# Patient Record
Sex: FEMALE | Race: OTHER | HISPANIC OR LATINO
[De-identification: names, ages, dates, MRNs, and addresses within clinical notes are randomized per-mention and may not be internally consistent; named-entity substitution may affect disease eponyms.]

---

## 2017-02-15 PROBLEM — Z00.00 ENCOUNTER FOR PREVENTIVE HEALTH EXAMINATION: Status: ACTIVE | Noted: 2017-02-15

## 2017-02-18 ENCOUNTER — APPOINTMENT (OUTPATIENT)
Dept: MRI IMAGING | Facility: HOSPITAL | Age: 72
End: 2017-02-18

## 2018-03-27 ENCOUNTER — INPATIENT (INPATIENT)
Facility: HOSPITAL | Age: 73
LOS: 9 days | Discharge: HOME | DRG: 872 | End: 2018-04-06
Attending: INTERNAL MEDICINE | Admitting: INTERNAL MEDICINE
Payer: MEDICARE

## 2018-03-27 VITALS
DIASTOLIC BLOOD PRESSURE: 72 MMHG | WEIGHT: 132.06 LBS | SYSTOLIC BLOOD PRESSURE: 111 MMHG | HEART RATE: 126 BPM | OXYGEN SATURATION: 97 % | RESPIRATION RATE: 18 BRPM | TEMPERATURE: 100 F

## 2018-03-27 LAB
ALBUMIN SERPL ELPH-MCNC: 3.7 G/DL — SIGNIFICANT CHANGE UP (ref 3.5–5)
ANION GAP SERPL CALC-SCNC: 10 MMOL/L — SIGNIFICANT CHANGE UP (ref 5–17)
APPEARANCE UR: CLEAR — SIGNIFICANT CHANGE UP
BASOPHILS # BLD AUTO: 0 K/UL — SIGNIFICANT CHANGE UP (ref 0–0.2)
BASOPHILS NFR BLD AUTO: 0.4 % — SIGNIFICANT CHANGE UP (ref 0–2)
BILIRUB UR-MCNC: NEGATIVE — SIGNIFICANT CHANGE UP
CALCIUM SERPL-MCNC: 9.4 MG/DL — SIGNIFICANT CHANGE UP (ref 8.4–10.5)
CHLORIDE SERPL-SCNC: 103 MMOL/L — SIGNIFICANT CHANGE UP (ref 96–108)
CO2 SERPL-SCNC: 25 MMOL/L — SIGNIFICANT CHANGE UP (ref 22–31)
COLOR SPEC: YELLOW — SIGNIFICANT CHANGE UP
DIFF PNL FLD: ABNORMAL
EOSINOPHIL # BLD AUTO: 0 K/UL — SIGNIFICANT CHANGE UP (ref 0–0.5)
EOSINOPHIL NFR BLD AUTO: 0.1 % — SIGNIFICANT CHANGE UP (ref 0–6)
GLUCOSE SERPL-MCNC: 198 MG/DL — HIGH (ref 70–99)
GLUCOSE UR QL: 1000 MG/DL
HCT VFR BLD CALC: 35.6 % — SIGNIFICANT CHANGE UP (ref 34.5–45)
HGB BLD-MCNC: 12 G/DL — SIGNIFICANT CHANGE UP (ref 11.5–15.5)
KETONES UR-MCNC: NEGATIVE — SIGNIFICANT CHANGE UP
LACTATE SERPL-SCNC: 2.9 MMOL/L — HIGH (ref 0.7–2)
LEUKOCYTE ESTERASE UR-ACNC: NEGATIVE — SIGNIFICANT CHANGE UP
LYMPHOCYTES # BLD AUTO: 0.8 K/UL — LOW (ref 1–3.3)
LYMPHOCYTES # BLD AUTO: 7.1 % — LOW (ref 13–44)
MCHC RBC-ENTMCNC: 29.7 PG — SIGNIFICANT CHANGE UP (ref 27–34)
MCHC RBC-ENTMCNC: 33.8 GM/DL — SIGNIFICANT CHANGE UP (ref 32–36)
MCV RBC AUTO: 88 FL — SIGNIFICANT CHANGE UP (ref 80–100)
MONOCYTES # BLD AUTO: 0.4 K/UL — SIGNIFICANT CHANGE UP (ref 0–0.9)
MONOCYTES NFR BLD AUTO: 3.8 % — SIGNIFICANT CHANGE UP (ref 2–14)
NEUTROPHILS # BLD AUTO: 10.2 K/UL — HIGH (ref 1.8–7.4)
NEUTROPHILS NFR BLD AUTO: 88.6 % — HIGH (ref 43–77)
NITRITE UR-MCNC: NEGATIVE — SIGNIFICANT CHANGE UP
PH UR: 5 — SIGNIFICANT CHANGE UP (ref 5–8)
PLATELET # BLD AUTO: 214 K/UL — SIGNIFICANT CHANGE UP (ref 150–400)
POTASSIUM SERPL-MCNC: 4.1 MMOL/L — SIGNIFICANT CHANGE UP (ref 3.5–5.3)
POTASSIUM SERPL-SCNC: 4.1 MMOL/L — SIGNIFICANT CHANGE UP (ref 3.5–5.3)
PROT UR-MCNC: NEGATIVE — SIGNIFICANT CHANGE UP
RBC # BLD: 4.05 M/UL — SIGNIFICANT CHANGE UP (ref 3.8–5.2)
RBC # FLD: 12.5 % — SIGNIFICANT CHANGE UP (ref 10.3–14.5)
SODIUM SERPL-SCNC: 138 MMOL/L — SIGNIFICANT CHANGE UP (ref 135–145)
SP GR SPEC: 1.01 — SIGNIFICANT CHANGE UP (ref 1.01–1.02)
UROBILINOGEN FLD QL: NEGATIVE — SIGNIFICANT CHANGE UP
WBC # BLD: 11.5 K/UL — HIGH (ref 3.8–10.5)
WBC # FLD AUTO: 11.5 K/UL — HIGH (ref 3.8–10.5)

## 2018-03-27 PROCEDURE — 71045 X-RAY EXAM CHEST 1 VIEW: CPT | Mod: 26

## 2018-03-27 RX ORDER — SODIUM CHLORIDE 9 MG/ML
3 INJECTION INTRAMUSCULAR; INTRAVENOUS; SUBCUTANEOUS ONCE
Refills: 0 | Status: COMPLETED | OUTPATIENT
Start: 2018-03-27 | End: 2018-03-27

## 2018-03-27 RX ORDER — ACETAMINOPHEN 500 MG
650 TABLET ORAL ONCE
Refills: 0 | Status: COMPLETED | OUTPATIENT
Start: 2018-03-27 | End: 2018-03-27

## 2018-03-27 RX ORDER — SODIUM CHLORIDE 9 MG/ML
1800 INJECTION INTRAMUSCULAR; INTRAVENOUS; SUBCUTANEOUS ONCE
Refills: 0 | Status: COMPLETED | OUTPATIENT
Start: 2018-03-27 | End: 2018-03-27

## 2018-03-27 RX ORDER — CEFTRIAXONE 500 MG/1
1 INJECTION, POWDER, FOR SOLUTION INTRAMUSCULAR; INTRAVENOUS ONCE
Refills: 0 | Status: COMPLETED | OUTPATIENT
Start: 2018-03-27 | End: 2018-03-27

## 2018-03-27 RX ADMIN — CEFTRIAXONE 100 GRAM(S): 500 INJECTION, POWDER, FOR SOLUTION INTRAMUSCULAR; INTRAVENOUS at 23:31

## 2018-03-27 RX ADMIN — SODIUM CHLORIDE 2700 MILLILITER(S): 9 INJECTION INTRAMUSCULAR; INTRAVENOUS; SUBCUTANEOUS at 23:31

## 2018-03-27 RX ADMIN — SODIUM CHLORIDE 3 MILLILITER(S): 9 INJECTION INTRAMUSCULAR; INTRAVENOUS; SUBCUTANEOUS at 23:24

## 2018-03-27 NOTE — ED PROVIDER NOTE - OBJECTIVE STATEMENT
71 y/o F pt with a significant PMHx of DM presents to the ED with chief complaint of fever, body aches and chills since 1830 today. Pt denies cough, abd pain, vomiting, chest pain, SOB, urinary sx or any other complaints. Allergies: Penicillin.

## 2018-03-27 NOTE — ED PROVIDER NOTE - MEDICAL DECISION MAKING DETAILS
Pt with persistently elevated lactate. Pt admitted for IV hydration. Blood cultures are pending. MAR endorsed. Pt agrees with admission. I had a detailed discussion with the patient and/or guardian regarding the historical points, exam findings, and any diagnostic results supporting the admit diagnosis.

## 2018-03-27 NOTE — ED ADULT NURSE NOTE - ED STAT RN HANDOFF DETAILS 2
report given to YANICK Daly in holding area in stable condition for continuation of care. pt a&ox3, admitted for elevated lactate. 20G LAC.

## 2018-03-28 ENCOUNTER — TRANSCRIPTION ENCOUNTER (OUTPATIENT)
Age: 73
End: 2018-03-28

## 2018-03-28 DIAGNOSIS — R68.83 CHILLS (WITHOUT FEVER): ICD-10-CM

## 2018-03-28 DIAGNOSIS — Z29.9 ENCOUNTER FOR PROPHYLACTIC MEASURES, UNSPECIFIED: ICD-10-CM

## 2018-03-28 DIAGNOSIS — E78.00 PURE HYPERCHOLESTEROLEMIA, UNSPECIFIED: ICD-10-CM

## 2018-03-28 DIAGNOSIS — E11.9 TYPE 2 DIABETES MELLITUS WITHOUT COMPLICATIONS: ICD-10-CM

## 2018-03-28 DIAGNOSIS — R79.89 OTHER SPECIFIED ABNORMAL FINDINGS OF BLOOD CHEMISTRY: ICD-10-CM

## 2018-03-28 LAB
ALP SERPL-CCNC: 83 U/L — SIGNIFICANT CHANGE UP (ref 40–120)
ALT FLD-CCNC: 16 U/L DA — SIGNIFICANT CHANGE UP (ref 10–60)
ANION GAP SERPL CALC-SCNC: 7 MMOL/L — SIGNIFICANT CHANGE UP (ref 5–17)
AST SERPL-CCNC: 11 U/L — SIGNIFICANT CHANGE UP (ref 10–40)
BILIRUB SERPL-MCNC: 0.5 MG/DL — SIGNIFICANT CHANGE UP (ref 0.2–1.2)
BUN SERPL-MCNC: 15 MG/DL — SIGNIFICANT CHANGE UP (ref 7–18)
BUN SERPL-MCNC: 20 MG/DL — HIGH (ref 7–18)
CALCIUM SERPL-MCNC: 8.7 MG/DL — SIGNIFICANT CHANGE UP (ref 8.4–10.5)
CHLORIDE SERPL-SCNC: 107 MMOL/L — SIGNIFICANT CHANGE UP (ref 96–108)
CHOLEST SERPL-MCNC: 126 MG/DL — SIGNIFICANT CHANGE UP (ref 10–199)
CO2 SERPL-SCNC: 26 MMOL/L — SIGNIFICANT CHANGE UP (ref 22–31)
CREAT SERPL-MCNC: 0.6 MG/DL — SIGNIFICANT CHANGE UP (ref 0.5–1.3)
CREAT SERPL-MCNC: 0.75 MG/DL — SIGNIFICANT CHANGE UP (ref 0.5–1.3)
GLUCOSE SERPL-MCNC: 150 MG/DL — HIGH (ref 70–99)
HBA1C BLD-MCNC: 9.7 % — HIGH (ref 4–5.6)
HCT VFR BLD CALC: 32.1 % — LOW (ref 34.5–45)
HDLC SERPL-MCNC: 49 MG/DL — SIGNIFICANT CHANGE UP (ref 40–125)
HGB BLD-MCNC: 10.9 G/DL — LOW (ref 11.5–15.5)
LACTATE SERPL-SCNC: 1.7 MMOL/L — SIGNIFICANT CHANGE UP (ref 0.7–2)
LIPID PNL WITH DIRECT LDL SERPL: 60 MG/DL — SIGNIFICANT CHANGE UP
MCHC RBC-ENTMCNC: 30.2 PG — SIGNIFICANT CHANGE UP (ref 27–34)
MCHC RBC-ENTMCNC: 34 GM/DL — SIGNIFICANT CHANGE UP (ref 32–36)
MCV RBC AUTO: 89 FL — SIGNIFICANT CHANGE UP (ref 80–100)
PLATELET # BLD AUTO: 185 K/UL — SIGNIFICANT CHANGE UP (ref 150–400)
POTASSIUM SERPL-MCNC: 4 MMOL/L — SIGNIFICANT CHANGE UP (ref 3.5–5.3)
POTASSIUM SERPL-SCNC: 4 MMOL/L — SIGNIFICANT CHANGE UP (ref 3.5–5.3)
PROCALCITONIN SERPL-MCNC: 1.14 NG/ML — HIGH (ref 0–0.04)
PROT SERPL-MCNC: 7.7 G/DL — SIGNIFICANT CHANGE UP (ref 6–8.3)
RAPID RVP RESULT: SIGNIFICANT CHANGE UP
RBC # BLD: 3.6 M/UL — LOW (ref 3.8–5.2)
RBC # FLD: 12.8 % — SIGNIFICANT CHANGE UP (ref 10.3–14.5)
SODIUM SERPL-SCNC: 140 MMOL/L — SIGNIFICANT CHANGE UP (ref 135–145)
T3 SERPL-MCNC: 88 NG/DL — SIGNIFICANT CHANGE UP (ref 80–200)
T4 AB SER-ACNC: 10.1 UG/DL — SIGNIFICANT CHANGE UP (ref 4.6–12)
TOTAL CHOLESTEROL/HDL RATIO MEASUREMENT: 2.6 RATIO — LOW (ref 3.3–7.1)
TRIGL SERPL-MCNC: 84 MG/DL — SIGNIFICANT CHANGE UP (ref 10–149)
TSH SERPL-MCNC: 0.06 UU/ML — LOW (ref 0.34–4.82)
WBC # BLD: 9.2 K/UL — SIGNIFICANT CHANGE UP (ref 3.8–10.5)
WBC # FLD AUTO: 9.2 K/UL — SIGNIFICANT CHANGE UP (ref 3.8–10.5)

## 2018-03-28 PROCEDURE — 99285 EMERGENCY DEPT VISIT HI MDM: CPT | Mod: 25

## 2018-03-28 PROCEDURE — 74177 CT ABD & PELVIS W/CONTRAST: CPT | Mod: 26

## 2018-03-28 RX ORDER — DEXTROSE 50 % IN WATER 50 %
25 SYRINGE (ML) INTRAVENOUS ONCE
Refills: 0 | Status: DISCONTINUED | OUTPATIENT
Start: 2018-03-28 | End: 2018-04-06

## 2018-03-28 RX ORDER — ENOXAPARIN SODIUM 100 MG/ML
40 INJECTION SUBCUTANEOUS DAILY
Refills: 0 | Status: DISCONTINUED | OUTPATIENT
Start: 2018-03-28 | End: 2018-04-06

## 2018-03-28 RX ORDER — ACETAMINOPHEN 500 MG
650 TABLET ORAL ONCE
Refills: 0 | Status: COMPLETED | OUTPATIENT
Start: 2018-03-28 | End: 2018-03-28

## 2018-03-28 RX ORDER — PIPERACILLIN AND TAZOBACTAM 4; .5 G/20ML; G/20ML
3.38 INJECTION, POWDER, LYOPHILIZED, FOR SOLUTION INTRAVENOUS ONCE
Refills: 0 | Status: COMPLETED | OUTPATIENT
Start: 2018-03-28 | End: 2018-03-28

## 2018-03-28 RX ORDER — ASPIRIN/CALCIUM CARB/MAGNESIUM 324 MG
81 TABLET ORAL DAILY
Refills: 0 | Status: DISCONTINUED | OUTPATIENT
Start: 2018-03-28 | End: 2018-04-06

## 2018-03-28 RX ORDER — SODIUM CHLORIDE 9 MG/ML
1000 INJECTION, SOLUTION INTRAVENOUS
Refills: 0 | Status: DISCONTINUED | OUTPATIENT
Start: 2018-03-28 | End: 2018-04-06

## 2018-03-28 RX ORDER — CIPROFLOXACIN LACTATE 400MG/40ML
400 VIAL (ML) INTRAVENOUS ONCE
Refills: 0 | Status: DISCONTINUED | OUTPATIENT
Start: 2018-03-28 | End: 2018-03-28

## 2018-03-28 RX ORDER — SODIUM CHLORIDE 9 MG/ML
1000 INJECTION INTRAMUSCULAR; INTRAVENOUS; SUBCUTANEOUS
Refills: 0 | Status: DISCONTINUED | OUTPATIENT
Start: 2018-03-28 | End: 2018-03-30

## 2018-03-28 RX ORDER — METRONIDAZOLE 500 MG
TABLET ORAL
Refills: 0 | Status: DISCONTINUED | OUTPATIENT
Start: 2018-03-28 | End: 2018-03-28

## 2018-03-28 RX ORDER — SIMVASTATIN 20 MG/1
10 TABLET, FILM COATED ORAL AT BEDTIME
Refills: 0 | Status: DISCONTINUED | OUTPATIENT
Start: 2018-03-28 | End: 2018-03-29

## 2018-03-28 RX ORDER — INSULIN LISPRO 100/ML
VIAL (ML) SUBCUTANEOUS
Refills: 0 | Status: DISCONTINUED | OUTPATIENT
Start: 2018-03-28 | End: 2018-04-06

## 2018-03-28 RX ORDER — CEFTRIAXONE 500 MG/1
INJECTION, POWDER, FOR SOLUTION INTRAMUSCULAR; INTRAVENOUS
Refills: 0 | Status: DISCONTINUED | OUTPATIENT
Start: 2018-03-28 | End: 2018-03-28

## 2018-03-28 RX ORDER — PIPERACILLIN AND TAZOBACTAM 4; .5 G/20ML; G/20ML
3.38 INJECTION, POWDER, LYOPHILIZED, FOR SOLUTION INTRAVENOUS EVERY 8 HOURS
Refills: 0 | Status: DISCONTINUED | OUTPATIENT
Start: 2018-03-28 | End: 2018-04-06

## 2018-03-28 RX ORDER — MORPHINE SULFATE 50 MG/1
1 CAPSULE, EXTENDED RELEASE ORAL ONCE
Refills: 0 | Status: DISCONTINUED | OUTPATIENT
Start: 2018-03-28 | End: 2018-03-28

## 2018-03-28 RX ORDER — DEXTROSE 50 % IN WATER 50 %
1 SYRINGE (ML) INTRAVENOUS ONCE
Refills: 0 | Status: DISCONTINUED | OUTPATIENT
Start: 2018-03-28 | End: 2018-04-06

## 2018-03-28 RX ORDER — CEFTRIAXONE 500 MG/1
1 INJECTION, POWDER, FOR SOLUTION INTRAMUSCULAR; INTRAVENOUS ONCE
Refills: 0 | Status: COMPLETED | OUTPATIENT
Start: 2018-03-28 | End: 2018-03-28

## 2018-03-28 RX ORDER — DEXTROSE 50 % IN WATER 50 %
12.5 SYRINGE (ML) INTRAVENOUS ONCE
Refills: 0 | Status: DISCONTINUED | OUTPATIENT
Start: 2018-03-28 | End: 2018-04-06

## 2018-03-28 RX ORDER — GLUCAGON INJECTION, SOLUTION 0.5 MG/.1ML
1 INJECTION, SOLUTION SUBCUTANEOUS ONCE
Refills: 0 | Status: DISCONTINUED | OUTPATIENT
Start: 2018-03-28 | End: 2018-04-06

## 2018-03-28 RX ORDER — CIPROFLOXACIN LACTATE 400MG/40ML
VIAL (ML) INTRAVENOUS
Refills: 0 | Status: DISCONTINUED | OUTPATIENT
Start: 2018-03-28 | End: 2018-03-28

## 2018-03-28 RX ORDER — METRONIDAZOLE 500 MG
500 TABLET ORAL ONCE
Refills: 0 | Status: DISCONTINUED | OUTPATIENT
Start: 2018-03-28 | End: 2018-03-28

## 2018-03-28 RX ORDER — LISINOPRIL 2.5 MG/1
2.5 TABLET ORAL DAILY
Refills: 0 | Status: DISCONTINUED | OUTPATIENT
Start: 2018-03-28 | End: 2018-03-29

## 2018-03-28 RX ADMIN — MORPHINE SULFATE 1 MILLIGRAM(S): 50 CAPSULE, EXTENDED RELEASE ORAL at 18:17

## 2018-03-28 RX ADMIN — CEFTRIAXONE 100 GRAM(S): 500 INJECTION, POWDER, FOR SOLUTION INTRAMUSCULAR; INTRAVENOUS at 19:03

## 2018-03-28 RX ADMIN — MORPHINE SULFATE 1 MILLIGRAM(S): 50 CAPSULE, EXTENDED RELEASE ORAL at 17:47

## 2018-03-28 RX ADMIN — ENOXAPARIN SODIUM 40 MILLIGRAM(S): 100 INJECTION SUBCUTANEOUS at 11:00

## 2018-03-28 RX ADMIN — SIMVASTATIN 10 MILLIGRAM(S): 20 TABLET, FILM COATED ORAL at 23:07

## 2018-03-28 RX ADMIN — Medication 1: at 17:48

## 2018-03-28 RX ADMIN — Medication 1: at 11:28

## 2018-03-28 RX ADMIN — Medication 81 MILLIGRAM(S): at 11:00

## 2018-03-28 RX ADMIN — Medication 650 MILLIGRAM(S): at 11:00

## 2018-03-28 RX ADMIN — PIPERACILLIN AND TAZOBACTAM 200 GRAM(S): 4; .5 INJECTION, POWDER, LYOPHILIZED, FOR SOLUTION INTRAVENOUS at 23:06

## 2018-03-28 RX ADMIN — SODIUM CHLORIDE 100 MILLILITER(S): 9 INJECTION INTRAMUSCULAR; INTRAVENOUS; SUBCUTANEOUS at 11:00

## 2018-03-28 NOTE — H&P ADULT - NSHPPHYSICALEXAM_GEN_ALL_CORE
Vital Signs Last 24 Hrs  T(C): 37.6 (27 Mar 2018 22:01), Max: 37.6 (27 Mar 2018 22:01)  T(F): 99.7 (27 Mar 2018 22:01), Max: 99.7 (27 Mar 2018 22:01)  HR: 98 (28 Mar 2018 03:08) (98 - 126)  BP: 114/78 (28 Mar 2018 03:08) (111/72 - 114/78)  BP(mean): --  RR: 19 (28 Mar 2018 03:08) (18 - 19)  SpO2: 100% (28 Mar 2018 03:08) (97% - 100%)    GENERAL: NAD  HEAD:  Atraumatic, Normocephalic  EYES: EOMI, PERRLA, conjunctiva and sclera clear  ENMT:  Moist mucous membranes  NECK: Supple  NERVOUS SYSTEM:  Alert & Oriented X3  CHEST/LUNG: Clear to auscultation bilaterally; No rales, rhonchi, wheezing, or rubs  HEART: Regular rate and rhythm; No murmurs, rubs, or gallops  ABDOMEN: Soft, Nontender, Nondistended; Bowel sounds present  EXTREMITIES:  2+ Peripheral Pulses, No clubbing, cyanosis, or edema

## 2018-03-28 NOTE — ED ADULT NURSE REASSESSMENT NOTE - NS ED NURSE REASSESS COMMENT FT1
Patient remains hemodynamically stable and in no acute distress, able to speak in full sentences and breathe comfortably in room air. Patient is also ambulatory with a steady gait.

## 2018-03-28 NOTE — DISCHARGE NOTE ADULT - PLAN OF CARE
Your hemoglobin A1c is 9.8%, which is higher than your goal. Continue Xigduo as you were taking it before. Additionally, take insulin lantus 6 units subcutaneously at bedtime. Check your fingerstick glucose 3 times daily before meals and at bedtime and keep a log. Please follow up with your PCP, who can adjust the medications as appropriate. Please take ceftin (cefuroxime) and flagyl (metronidazole) as prescribed for 6 more days. Follow up with your PCP within 7 days of discharge. You will need a colonoscopy in 4-6 weeks. to treat Continue simvastatin. Follow up with PCP. Continue lisinopril. Follow up with PCP.

## 2018-03-28 NOTE — DISCHARGE NOTE ADULT - OTHER SIGNIFICANT FINDINGS
Medicine attending note:  Patient's history, vitals, labs, imaging studies reviewed. Discussed with above resident, agree with note with edits. Plan of care discussed with patient, and agrees, all questions answered. Patient is medically stable for discharge home. Patient to follow up as indicated above.   Rox Ash MD  Time spent > 45 minutes  4/6/2018

## 2018-03-28 NOTE — H&P ADULT - PROBLEM SELECTOR PLAN 1
Patient does not meet the SIRS criteria.  Tmax- 99.7, HR-126, /72, WBC- 11.5  Lactate- 2.9--> 2.3  s/p 1800ml NS bolus and CTX  UA- negative  CXR- negative  RVP- negative  - f/u blood cx  - f/u urine cx  - f/u procalcitonin  - will hold off to antibiotics at this time- patient does not meet the septic criteria, and there is no source. However will add coverage if she clinically worsens.

## 2018-03-28 NOTE — H&P ADULT - ASSESSMENT
73 y/o F with PMHx of DM and HLD came in with c/o chills today. She said her whole body started shaking along with body aches. In ED patient had Tmax- 99.7 and HR- 126 with lactate of 2.9. She was code sepsis s/p 1800 ml NS bolus and 1 dose of CTX.

## 2018-03-28 NOTE — H&P ADULT - HISTORY OF PRESENT ILLNESS
71 y/o F with PMHx of DM and HLD came in with c/o chills today. She said her whole body started shaking along with body aches. She denies taking any medication urszula 73 y/o F with PMHx of DM and HLD came in with c/o chills today. She said her whole body started shaking along with body aches. She denies taking any medication prior to coming in. She denies cough, runny nose, or itchy eyes. Denies congestion. Denies abdominal pain, nausea or vomiting. Denies dysuria, or increased urinary frequency. No other complaints at this time. No sick contacts. No recent travels    SH: Lives with family, ambulates independently. Denies smoking, alcohol or illicit drug use.  Penicillin allergy- Rash  FH- Diabetes- Mother  Full code

## 2018-03-28 NOTE — DISCHARGE NOTE ADULT - PATIENT PORTAL LINK FT
You can access the [facility] Portal, offered by [facility], by registering with the following website: [email or web address]

## 2018-03-28 NOTE — H&P ADULT - NSHPLABSRESULTS_GEN_ALL_CORE
12.0   11.5  )-----------( 214      ( 27 Mar 2018 23:34 )             35.6     03-27    138  |  103  |  20<H>  ----------------------------<  198<H>  4.1   |  25  |  0.75    Ca    9.4      27 Mar 2018 23:34    TPro  7.7  /  Alb  3.7  /  TBili  0.5  /  DBili  x   /  AST  11  /  ALT  16  /  AlkPhos  83  03-27

## 2018-03-28 NOTE — DISCHARGE NOTE ADULT - MEDICATION SUMMARY - MEDICATIONS TO TAKE
I will START or STAY ON the medications listed below when I get home from the hospital:    alcohol swab  -- Indication: For Diabetes    lancet  -- Indication: For Diabetes    glucometer  -- Indication: For Diabetes    Flagyl 500 mg oral tablet  -- 1 tab(s) by mouth 3 times a day   -- Do not drink alcoholic beverages when taking this medication.  Finish all this medication unless otherwise directed by prescriber.  May discolor urine or feces.    -- Indication: For Diverticulitis     aspirin 81 mg oral tablet, chewable  -- 1 tab(s) by mouth once a day  -- Indication: For prophylaxis     lisinopril 2.5 mg oral tablet  -- 1 tab(s) by mouth once a day  -- Indication: For Hypertension    gabapentin 100 mg oral capsule  -- 1 cap(s) by mouth 2 times a day  -- Indication: For Diabetic neuropathy     Lantus Solostar Pen 100 units/mL subcutaneous solution  -- 6 unit(s) subcutaneous once a day (at bedtime)   -- Do not drink alcoholic beverages when taking this medication.  It is very important that you take or use this exactly as directed.  Do not skip doses or discontinue unless directed by your doctor.  Keep in refrigerator.  Do not freeze.    -- Indication: For Diabetes    Xigduo XR 5 mg-1000 mg oral tablet, extended release  -- orally 2 times a day  -- Indication: For Diabetes    simvastatin 10 mg oral tablet  -- 1 tab(s) by mouth once a day (at bedtime)  -- Indication: For Hypercholesteremia    cefuroxime 500 mg oral tablet  -- 1 tab(s) by mouth 2 times a day   -- Finish all this medication unless otherwise directed by prescriber.  Medication should be taken with plenty of water.  Take with food or milk.    -- Indication: For Diverticulitis

## 2018-03-28 NOTE — DISCHARGE NOTE ADULT - CARE PLAN
Principal Discharge DX:	Sigmoid diverticulitis  Goal:	to treat  Assessment and plan of treatment:	Please take ceftin (cefuroxime) and flagyl (metronidazole) as prescribed for 6 more days. Follow up with your PCP within 7 days of discharge. You will need a colonoscopy in 4-6 weeks.  Secondary Diagnosis:	DM (diabetes mellitus)  Assessment and plan of treatment:	Your hemoglobin A1c is 9.8%, which is higher than your goal. Continue Xigduo as you were taking it before. Additionally, take insulin lantus 6 units subcutaneously at bedtime. Check your fingerstick glucose 3 times daily before meals and at bedtime and keep a log. Please follow up with your PCP, who can adjust the medications as appropriate.  Secondary Diagnosis:	Hypercholesteremia  Assessment and plan of treatment:	Continue simvastatin. Follow up with PCP.  Secondary Diagnosis:	Hypertension  Assessment and plan of treatment:	Continue lisinopril. Follow up with PCP.

## 2018-03-28 NOTE — H&P ADULT - PROBLEM SELECTOR PLAN 4
[] Previous VTE                                                3  [] Thrombophilia                                             2  [] Lower limb paralysis                                   2    [] Current Cancer                                             2   [X] Immobilization > 24 hrs                              1  [] ICU/CCU stay > 24 hours                             1  [X] Age > 60                                                         1    IMPROVE VTE Score: 2  lovenox c/w simvastatin  f/u lipid profile

## 2018-03-28 NOTE — DISCHARGE NOTE ADULT - HOSPITAL COURSE
73 y/o F with PMHx of DM and HLD came in with c/o chills, shaking body aches for one day. She denies cough, runny nose,  or congestion. Denies abdominal pain, nausea or vomiting. Denies dysuria, or increased urinary frequency. No other complaints at this time. No sick contacts. No recent travels  In ED patient had Tmax- 99.7 and HR- 126 with lactate of 2.9. Patient  received 1800 ml NS bolus  and 1 dose of Ceftriaxone      Admitted for  febrile  illness.  does not meet the SIRS criteria.  Leukocytosis resolved, today WBC 9.2   Lactate- 2.9, today lactates WNL 1.7     Urinalysis shows no infection   CXR- no acute findings   RVP- negative  Blood culture pending Patient is a 72 year-old female with PMH of DM, HLD who presented with chills, body aches. She was initially admitted for evaluation of febrile illness. CT of the abdomen with IV contrast shows sigmoid diverticulitis with perforation. Patient is a 72 year-old female with PMH of DM II, HLD who presented with chills, body aches. She was initially admitted for evaluation of febrile illness. CT of the abdomen with IV contrast shows sigmoid diverticulitis with perforation. Patient is a 72 year-old female with PMH of DM II, HLD who presented with chills, body aches. She was initially admitted for evaluation of febrile illness but then developed left lower quadrant tenderness of the abdomen. CT of the abdomen with IV contrast shows sigmoid diverticulitis with perforation. She was admitted to medicine for further management. She was placed on zosyn, made NPO, and given IV fluids. Surgery was consulted and agreed with medical management. Serial abdominal exams showed improvement in pain and diet was advanced to clear liquids. Repeat CT scan of the abdomen showed persistent sigmoid diverticulitis with microperforation. Patient was continued on clear liquids. Tenderness to palpation resolved. Diet was advanced to soft and then regular which patient tolerated well.     Hemoglobin A1c is 9.8% which indicates poorly controlled diabetes. Discussed the importance of glycemic control with her. When diet was advanced to regular, patient was given lantus 6 units at bedtime. She was taught how to use insulin. Advised her to check her fingerstick glucose three times daily before meals and at bedtime and to keep a log. She will follow up with her PCP to adjust insulin regimen. She will continue xigduo in addition to lantus.     Patient is clinically stable for discharge to home. She will complete antibiotics course with Patient is a 72 year-old female with PMH of DM II, HLD who presented with chills, body aches. She was initially admitted for evaluation of febrile illness but then developed left lower quadrant tenderness of the abdomen. CT of the abdomen with IV contrast shows sigmoid diverticulitis with perforation. She was admitted to medicine for further management. She was placed on zosyn, made NPO, and given IV fluids. Surgery was consulted and agreed with medical management. Serial abdominal exams showed improvement in pain and diet was advanced to clear liquids. Repeat CT scan of the abdomen showed persistent sigmoid diverticulitis with microperforation. Patient was continued on clear liquids. Tenderness to palpation resolved. Diet was advanced to soft and then regular which patient tolerated well.     Hemoglobin A1c is 9.8% which indicates poorly controlled diabetes. Discussed the importance of glycemic control with her. When diet was advanced to regular, patient was given lantus 6 units at bedtime. She was taught how to use insulin. Advised her to check her fingerstick glucose three times daily before meals and at bedtime and to keep a log. She will follow up with her PCP to adjust insulin regimen. She will continue xigduo in addition to lantus.     Patient is clinically stable for discharge to home. She will complete antibiotics course with ceftin 500mg twice daily for 6 days and flagyl 500mg three times daily for 6 days. She will follow up with PCP and likely need a colonoscopy in 4-6 weeks.

## 2018-03-28 NOTE — H&P ADULT - PROBLEM SELECTOR PLAN 2
on Xigduo at home  - f/u A1c  - sliding scale for now Lactate- 2.9--> 2.3, s/p 1800ml NS bolus  continue IV hydration  Monitor lactate level

## 2018-03-28 NOTE — H&P ADULT - ATTENDING COMMENTS
Patient seen and examined. Patient's history, vitals, labs, imaging studies reviewed. Discussed with above resident, agree with note with edits. Plan of care discussed with patient, and agrees, all questions answered.   Rox Ash MD

## 2018-03-28 NOTE — H&P ADULT - NSHPREVIEWOFSYSTEMS_GEN_ALL_CORE
REVIEW OF SYSTEMS:  CONSTITUTIONAL: No fever, weight loss, or fatigue  RESPIRATORY: No cough, wheezing, chills or hemoptysis; No shortness of breath  CARDIOVASCULAR: No chest pain, palpitations, dizziness, or leg swelling  GASTROINTESTINAL: No abdominal or epigastric pain. No nausea, vomiting, or hematemesis; No diarrhea or constipation. No melena or hematochezia.  GENITOURINARY: No dysuria, frequency, hematuria, or incontinence  NEUROLOGICAL: No headaches, memory loss, loss of strength, numbness, or tremors  SKIN: No itching, burning, rashes, or lesions   LYMPH NODES: No enlarged glands  ENDOCRINE: No heat or cold intolerance; No hair loss  MUSCULOSKELETAL: No joint pain or swelling; No muscle, back, or extremity pain

## 2018-03-28 NOTE — CONSULT NOTE ADULT - SUBJECTIVE AND OBJECTIVE BOX
HPI:  71 y/o F with PMHx of DM and HLD came in with c/o chills today. She said her whole body started shaking along with body aches. She denies taking any medication prior to coming in. complaining of LLQ abd pain/"burning"  had previous neg colonoscopy per pt about 5 yrs ago    < from: CT Abdomen and Pelvis w/ IV Cont (03.28.18 @ 17:40) >  Scattered colonic diverticula. Inflammatory changes is seen adjacent   to a   prominent diverticulum in the mid sigmoid colon. Pocket of air adjacent   to the   diverticulum seen on axial series 4 image 21 and sagittal image [id number] may   be   extraluminal. No abscess collection. No free air beyond the mesentery in   the   anterior abdomen.  Findings are consistent with acute diverticulitis with   possible ruptured diverticulum and possible extraluminal air.  2.  Diffuse wall thickening seen in the midsigmoid colon which appears to   be   reactive secondary to adjacent inflammatory changes. There is narrowing   of the   lumen. Large amount of stool seen in the colon. No evidence of   obstruction.    < end of copied text >      SH: Lives with family, ambulates independently. Denies smoking, alcohol or illicit drug use.  Penicillin allergy- Rash  FH- Diabetes- Mother  Full code (28 Mar 2018 05:36)      PAST MEDICAL & SURGICAL HISTORY:  Hypercholesteremia  DM (diabetes mellitus)  No significant past surgical history      Vital Signs Last 24 Hrs  T(C): 38.1 (28 Mar 2018 21:27), Max: 38.8 (28 Mar 2018 16:56)  T(F): 100.5 (28 Mar 2018 21:27), Max: 101.8 (28 Mar 2018 16:56)  HR: 105 (28 Mar 2018 21:27) (80 - 105)  BP: 130/72 (28 Mar 2018 21:27) (99/46 - 134/57)  BP(mean): --  RR: 18 (28 Mar 2018 21:27) (16 - 19)  SpO2: 97% (28 Mar 2018 21:27) (97% - 100%)                          10.9   9.2   )-----------( 185      ( 28 Mar 2018 07:30 )             32.1     03-28    140  |  107  |  15  ----------------------------<  150<H>  4.0   |  26  |  0.60    Ca    8.7      28 Mar 2018 07:30    TPro  7.7  /  Alb  3.7  /  TBili  0.5  /  DBili  x   /  AST  11  /  ALT  16  /  AlkPhos  83  03-27        PHYSICAL EXAM  NAD  alert, oriented x3  comfortable  Abdomen: soft, ND, tender LLQ with some mild rebound, no guarding, no peritoneal signs      ASSESSMENT/ PLAN:  sigmoid divertic with localized perf, no abscess or gross free air  admitted medical service  npo, hydrate, serial abd exams/cbc, iv abx, GI consult  case d/w Dr Linton

## 2018-03-28 NOTE — H&P ADULT - PROBLEM SELECTOR PLAN 5
[] Previous VTE                                                3  [] Thrombophilia                                             2  [] Lower limb paralysis                                   2    [] Current Cancer                                             2   [X] Immobilization > 24 hrs                              1  [] ICU/CCU stay > 24 hours                             1  [X] Age > 60                                                         1    IMPROVE VTE Score: 2  lovenox

## 2018-03-29 DIAGNOSIS — K57.32 DIVERTICULITIS OF LARGE INTESTINE WITHOUT PERFORATION OR ABSCESS WITHOUT BLEEDING: ICD-10-CM

## 2018-03-29 LAB
ALBUMIN SERPL ELPH-MCNC: 3 G/DL — LOW (ref 3.5–5)
ALP SERPL-CCNC: 66 U/L — SIGNIFICANT CHANGE UP (ref 40–120)
ALT FLD-CCNC: 12 U/L DA — SIGNIFICANT CHANGE UP (ref 10–60)
ANION GAP SERPL CALC-SCNC: 10 MMOL/L — SIGNIFICANT CHANGE UP (ref 5–17)
AST SERPL-CCNC: 11 U/L — SIGNIFICANT CHANGE UP (ref 10–40)
BASOPHILS # BLD AUTO: 0.1 K/UL — SIGNIFICANT CHANGE UP (ref 0–0.2)
BASOPHILS NFR BLD AUTO: 0.6 % — SIGNIFICANT CHANGE UP (ref 0–2)
BILIRUB SERPL-MCNC: 0.8 MG/DL — SIGNIFICANT CHANGE UP (ref 0.2–1.2)
BUN SERPL-MCNC: 13 MG/DL — SIGNIFICANT CHANGE UP (ref 7–18)
CALCIUM SERPL-MCNC: 8.7 MG/DL — SIGNIFICANT CHANGE UP (ref 8.4–10.5)
CHLORIDE SERPL-SCNC: 105 MMOL/L — SIGNIFICANT CHANGE UP (ref 96–108)
CO2 SERPL-SCNC: 23 MMOL/L — SIGNIFICANT CHANGE UP (ref 22–31)
CREAT SERPL-MCNC: 0.5 MG/DL — SIGNIFICANT CHANGE UP (ref 0.5–1.3)
CULTURE RESULTS: SIGNIFICANT CHANGE UP
EOSINOPHIL # BLD AUTO: 0 K/UL — SIGNIFICANT CHANGE UP (ref 0–0.5)
EOSINOPHIL NFR BLD AUTO: 0.2 % — SIGNIFICANT CHANGE UP (ref 0–6)
GLUCOSE SERPL-MCNC: 82 MG/DL — SIGNIFICANT CHANGE UP (ref 70–99)
HBA1C BLD-MCNC: 9.5 % — HIGH (ref 4–5.6)
HCT VFR BLD CALC: 32.9 % — LOW (ref 34.5–45)
HGB BLD-MCNC: 11.2 G/DL — LOW (ref 11.5–15.5)
LYMPHOCYTES # BLD AUTO: 1.1 K/UL — SIGNIFICANT CHANGE UP (ref 1–3.3)
LYMPHOCYTES # BLD AUTO: 10.7 % — LOW (ref 13–44)
MAGNESIUM SERPL-MCNC: 2 MG/DL — SIGNIFICANT CHANGE UP (ref 1.6–2.6)
MCHC RBC-ENTMCNC: 30 PG — SIGNIFICANT CHANGE UP (ref 27–34)
MCHC RBC-ENTMCNC: 33.9 GM/DL — SIGNIFICANT CHANGE UP (ref 32–36)
MCV RBC AUTO: 88.4 FL — SIGNIFICANT CHANGE UP (ref 80–100)
MONOCYTES # BLD AUTO: 0.6 K/UL — SIGNIFICANT CHANGE UP (ref 0–0.9)
MONOCYTES NFR BLD AUTO: 5.3 % — SIGNIFICANT CHANGE UP (ref 2–14)
NEUTROPHILS # BLD AUTO: 8.7 K/UL — HIGH (ref 1.8–7.4)
NEUTROPHILS NFR BLD AUTO: 83.2 % — HIGH (ref 43–77)
PHOSPHATE SERPL-MCNC: 3.4 MG/DL — SIGNIFICANT CHANGE UP (ref 2.5–4.5)
PLATELET # BLD AUTO: 193 K/UL — SIGNIFICANT CHANGE UP (ref 150–400)
POTASSIUM SERPL-MCNC: 3.6 MMOL/L — SIGNIFICANT CHANGE UP (ref 3.5–5.3)
POTASSIUM SERPL-SCNC: 3.6 MMOL/L — SIGNIFICANT CHANGE UP (ref 3.5–5.3)
PROT SERPL-MCNC: 7.1 G/DL — SIGNIFICANT CHANGE UP (ref 6–8.3)
RBC # BLD: 3.72 M/UL — LOW (ref 3.8–5.2)
RBC # FLD: 12.5 % — SIGNIFICANT CHANGE UP (ref 10.3–14.5)
SODIUM SERPL-SCNC: 138 MMOL/L — SIGNIFICANT CHANGE UP (ref 135–145)
SPECIMEN SOURCE: SIGNIFICANT CHANGE UP
T3 SERPL-MCNC: 52 NG/DL — LOW (ref 80–200)
T3FREE SERPL-MCNC: 1.33 PG/ML — LOW (ref 1.8–4.6)
T4 AB SER-ACNC: 9.1 UG/DL — SIGNIFICANT CHANGE UP (ref 4.6–12)
T4 FREE SERPL-MCNC: 1.1 NG/DL — SIGNIFICANT CHANGE UP (ref 0.9–1.8)
WBC # BLD: 10.4 K/UL — SIGNIFICANT CHANGE UP (ref 3.8–10.5)
WBC # FLD AUTO: 10.4 K/UL — SIGNIFICANT CHANGE UP (ref 3.8–10.5)

## 2018-03-29 RX ORDER — SODIUM CHLORIDE 9 MG/ML
1000 INJECTION, SOLUTION INTRAVENOUS
Refills: 0 | Status: DISCONTINUED | OUTPATIENT
Start: 2018-03-29 | End: 2018-03-30

## 2018-03-29 RX ORDER — ACETAMINOPHEN 500 MG
1000 TABLET ORAL ONCE
Refills: 0 | Status: COMPLETED | OUTPATIENT
Start: 2018-03-29 | End: 2018-03-29

## 2018-03-29 RX ADMIN — ENOXAPARIN SODIUM 40 MILLIGRAM(S): 100 INJECTION SUBCUTANEOUS at 15:16

## 2018-03-29 RX ADMIN — Medication 400 MILLIGRAM(S): at 15:17

## 2018-03-29 RX ADMIN — PIPERACILLIN AND TAZOBACTAM 25 GRAM(S): 4; .5 INJECTION, POWDER, LYOPHILIZED, FOR SOLUTION INTRAVENOUS at 18:15

## 2018-03-29 RX ADMIN — SODIUM CHLORIDE 50 MILLILITER(S): 9 INJECTION, SOLUTION INTRAVENOUS at 18:47

## 2018-03-29 RX ADMIN — PIPERACILLIN AND TAZOBACTAM 25 GRAM(S): 4; .5 INJECTION, POWDER, LYOPHILIZED, FOR SOLUTION INTRAVENOUS at 10:28

## 2018-03-29 RX ADMIN — PIPERACILLIN AND TAZOBACTAM 25 GRAM(S): 4; .5 INJECTION, POWDER, LYOPHILIZED, FOR SOLUTION INTRAVENOUS at 01:06

## 2018-03-29 RX ADMIN — SODIUM CHLORIDE 100 MILLILITER(S): 9 INJECTION INTRAMUSCULAR; INTRAVENOUS; SUBCUTANEOUS at 05:00

## 2018-03-29 NOTE — CONSULT NOTE ADULT - SUBJECTIVE AND OBJECTIVE BOX
NOT COMPLETE       HPI:  ID consult was called to evaluate this 73 y/o female from home for acute perforated diverticulitis with microperf.  Patient has pmhx significant for uncontrolled DM.  Patient presented to [facility] yesterday 2/2 c/o chills.  +CT A/P with findings of acute diverticulitis of proximal sigmoid colon with contained microperforation, no abscess noted.  Patient had fevers spikes yesterday of 100.5F and 101.8F.  Cultures are negative so far.  Leukocytosis has resolved and lactate levels have improved.    As per H&P:  73 y/o F with PMHx of DM and HLD came in with c/o chills today. She said her whole body started shaking along with body aches. She denies taking any medication prior to coming in. She denies cough, runny nose, or itchy eyes. Denies congestion. Denies abdominal pain, nausea or vomiting. Denies dysuria, or increased urinary frequency. No other complaints at this time. No sick contacts. No recent travels. Full code. (28 Mar 2018 05:36)    REVIEW OF SYSTEMS:  [  ] Not able to illicit  General:	  Chest:	  GI:	  :  Skin:	  Musculoskeletal:	  Neuro:    PAST MEDICAL & SURGICAL HISTORY:  Hypercholesteremia  DM (diabetes mellitus)  No significant past surgical history    ALLERGIES: penicillin (Rash)    MEDS:  acetaminophen  IVPB. 1000 milliGRAM(s) IV Intermittent once PRN  aspirin  chewable 81 milliGRAM(s) Oral daily  dextrose 5%. 1000 milliLiter(s) IV Continuous <Continuous>  dextrose 50% Injectable 12.5 Gram(s) IV Push once  dextrose 50% Injectable 25 Gram(s) IV Push once  dextrose 50% Injectable 25 Gram(s) IV Push once  dextrose Gel 1 Dose(s) Oral once PRN  enoxaparin Injectable 40 milliGRAM(s) SubCutaneous daily  glucagon  Injectable 1 milliGRAM(s) IntraMuscular once PRN  insulin lispro (HumaLOG) corrective regimen sliding scale   SubCutaneous three times a day before meals  piperacillin/tazobactam IVPB. 3.375 Gram(s) IV Intermittent every 8 hours (3/29)  sodium chloride 0.9%. 1000 milliLiter(s) IV Continuous <Continuous>    SOCIAL HISTORY:  Lives with family, ambulates independently. Denies smoking, alcohol or illicit drug use.    FAMILY HISTORY: DM - mother    VITALS:  Vital Signs Last 24 Hrs  T(C): 36.6 (29 Mar 2018 04:51), Max: 38.8 (28 Mar 2018 16:56)  T(F): 97.9 (29 Mar 2018 04:51), Max: 101.8 (28 Mar 2018 16:56)  HR: 91 (29 Mar 2018 04:51) (91 - 105)  BP: 118/74 (29 Mar 2018 04:51) (111/65 - 134/57)  BP(mean): --  RR: 16 (29 Mar 2018 04:51) (16 - 18)  SpO2: 97% (29 Mar 2018 04:51) (97% - 97%)      PHYSICAL EXAM:  Constitutional:  HEENT:  Neck:  Respiratory:  Cardiovascular:  Gastrointestinal:  Extremities:  Skin:  Ortho:  Neuro:      LABS/DIAGNOSTIC TESTS:                        11.2   10.4  )-----------( 193      ( 29 Mar 2018 07:10 )             32.9     WBC Count: 10.4 K/uL ( @ 07:10)  WBC Count: 9.2 K/uL ( @ 07:30)  WBC Count: 11.5 K/uL ( @ 23:34)        138  |  105  |  13  ----------------------------<  82  3.6   |  23  |  0.50    Ca    8.7      29 Mar 2018 07:10  Phos  3.4       Mg     2.0         TPro  7.1  /  Alb  3.0<L>  /  TBili  0.8  /  DBili  x   /  AST  11  /  ALT  12  /  AlkPhos  66      Urinalysis Basic - ( 27 Mar 2018 23:34 )    Color: Yellow / Appearance: Clear / S.010 / pH: x  Gluc: x / Ketone: Negative  / Bili: Negative / Urobili: Negative   Blood: x / Protein: Negative / Nitrite: Negative   Leuk Esterase: Negative / RBC: 2-5 /HPF / WBC 0-2 /HPF   Sq Epi: x / Non Sq Epi: x / Bacteria: Trace /HPF    LIVER FUNCTIONS - ( 29 Mar 2018 07:10 )  Alb: 3.0 g/dL / Pro: 7.1 g/dL / ALK PHOS: 66 U/L / ALT: 12 U/L DA / AST: 11 U/L / GGT: x           Hemoglobin A1C, Whole Blood: 9.5: Method: Immunoassay         Lactate, Blood: 1.7 mmol/L (18 @ 07:28)  Lactate, Blood: 2.3 mmol/L (18 @ 01:13)  Lactate, Blood: 2.9 mmol/L (18 @ 23:34)    Rapid Respiratory Viral Panel (18 @ 01:58)    Rapid RVP Result: NotDetec      CULTURES:   .Blood Blood-Peripheral   @ 10:12   No growth to date.  --  --      .Urine Clean Catch (Midstream)   @ 10:06   <10,000 CFU/ml Normal Urogenital adolfo present  --  --      RADIOLOGY:  EXAM:  CT ABDOMEN AND PELVIS IC                        PROCEDURE DATE:  2018    INTERPRETATION:  CT of the abdomen and pelvis with IV contrast    Clinical Indication: Lower quadrant abdominal pain    Technique: Axial multidetector CT images of the abdomen and pelvis are   acquired following the administration of oral and IV contrast (94 cc   Omnipaque-350 administered, 6 cc discarded).    Comparison: None.    Findings:    Abdomen: Limited sections through the lung bases demonstrate a small   hiatal hernia. Small bilateral atelectasis. Mildly dilated common bile   duct at 9 mm in caliber. Slight dilatation of the main pancreatic duct   measuring 4 mm in caliber at the pancreatic head. No evidence for a   pancreatic mass at CT.    The liver, gallbladder, spleen, and adrenals appear unremarkable. Small   hypodense lesions in the kidneys bilaterally, too small to characterize.   Extrarenal pelvises bilaterally.    The appendix appears normal. Colonic diverticulosis. No evidence for   bowel obstruction. No evidence for free air in the upper abdomen. No   ascites or enlarged lymph node.    Pelvis: Apparent focal mural thickening at the proximal sigmoid colon   with adjacent stranding and diverticulosis, compatible with   diverticulitis. No evidence for a drainable abscess. Small extraluminal   air from a contained microperforation versus diverticulum adjacent to the   abnormal proximal sigmoid colon (image 45 series 602). No pelvic free   fluid, or enlarged lymph node.Apparent mural thickening at the urinary   bladder dome. The uterus is not visualized, probably surgically absent.    Impression: Mildly dilated common bile duct. Slight dilatation of the   main pancreatic duct in the pancreatic head. Although there is no   evidence for a pancreatic mass at CT, a small obstructive pancreatic head   mass or ampullary tumor cannot be excluded. If clinically indicated,   abdominal MR without and with IV contrast including MRCP may be pursued   for further evaluation.    Apparent focal mural thickening at the proximal sigmoid colon with   adjacent stranding and diverticulosis, compatible with diverticulitis. No   evidence for a drainable abscess. Small extraluminal air from a contained   microperforation versus adiverticulum adjacent to the abnormal proximal   ascending colon.    Apparent mural thickening at the urinary bladder dome. This may be due to   cystitis or urinary bladder cancer. Clinical correlation is recommended.    Other findings as above.    A preliminary report was provided by [employer].         EXAM:  XR CHEST AP OR PA 1V                        PROCEDURE DATE:  2018    INTERPRETATION:  INDICATION: Shortness of Breath    PRIORS: None    VIEWS: Portable AP radiography of the chest performed.    FINDINGS: Heart size appears within normal limits. No hilar or superior   mediastinal abnormalities are identified. There is no evidence for focal   infiltrate, lobar consolidation or pulmonary edema. No pleural effusion   or pneumothorax is demonstrated. No mediastinal shift is noted. The   visualized osseous structures appear unremarkable. An identified safety   pin and key are presumed extrinsic to the patient; clinical correlation   suggested.    IMPRESSION: No evidence for focal infiltrate or lobar consolidation. NOT COMPLETE       HPI:  ID consult was called to evaluate this 71 y/o female from home for acute perforated diverticulitis with microperf.  Patient has pmhx significant for uncontrolled DM.  Patient presented to [facility] yesterday 2/2 c/o chills.  +CT A/P with findings of acute diverticulitis of proximal sigmoid colon with contained microperforation, no abscess noted.  Patient had fevers spikes yesterday of 100.5F and 101.8F.  Cultures are negative so far.  Leukocytosis has resolved and lactate levels have improved.    As per H&P:  71 y/o F with PMHx of DM and HLD came in with c/o chills today. She said her whole body started shaking along with body aches. She denies taking any medication prior to coming in. She denies cough, runny nose, or itchy eyes. Denies congestion. Denies abdominal pain, nausea or vomiting. Denies dysuria, or increased urinary frequency. No other complaints at this time. No sick contacts. No recent travels. Full code. (28 Mar 2018 05:36)    REVIEW OF SYSTEMS:  [  ] Not able to illicit  General: no fevers no malaise   Chest: no cough no sob no CP  GI: no nvd +abdominal pain  : no urinary symptoms   Skin: no rashes no cyanosis  Musculoskeletal: no trauma no LBP  Neuro: +ha's +dizziness     PAST MEDICAL & SURGICAL HISTORY:  Hypercholesteremia  DM (diabetes mellitus)  No significant past surgical history    ALLERGIES: penicillin (Rash)    MEDS:  acetaminophen  IVPB. 1000 milliGRAM(s) IV Intermittent once PRN  aspirin  chewable 81 milliGRAM(s) Oral daily  dextrose 5%. 1000 milliLiter(s) IV Continuous <Continuous>  dextrose 50% Injectable 12.5 Gram(s) IV Push once  dextrose 50% Injectable 25 Gram(s) IV Push once  dextrose 50% Injectable 25 Gram(s) IV Push once  dextrose Gel 1 Dose(s) Oral once PRN  enoxaparin Injectable 40 milliGRAM(s) SubCutaneous daily  glucagon  Injectable 1 milliGRAM(s) IntraMuscular once PRN  insulin lispro (HumaLOG) corrective regimen sliding scale   SubCutaneous three times a day before meals  piperacillin/tazobactam IVPB. 3.375 Gram(s) IV Intermittent every 8 hours (3/29)  sodium chloride 0.9%. 1000 milliLiter(s) IV Continuous <Continuous>    SOCIAL HISTORY:  Lives with family, ambulates independently. Denies smoking, alcohol or illicit drug use.    FAMILY HISTORY: DM - mother    VITALS:  Vital Signs Last 24 Hrs  T(C): 36.6 (29 Mar 2018 04:51), Max: 38.8 (28 Mar 2018 16:56)  T(F): 97.9 (29 Mar 2018 04:51), Max: 101.8 (28 Mar 2018 16:56)  HR: 91 (29 Mar 2018 04:51) (91 - 105)  BP: 118/74 (29 Mar 2018 04:51) (111/65 - 134/57)  BP(mean): --  RR: 16 (29 Mar 2018 04:51) (16 - 18)  SpO2: 97% (29 Mar 2018 04:51) (97% - 97%)      PHYSICAL EXAM:  Constitutional: well developed elderly female in NAD  HEENT: normocephalic with moist oral mucosa  Neck: supple no LN's no JVD  Respiratory: lungs clear no rales no rhonchi  Cardiovascular: S1 S2 reg no murmurs  Gastrointestinal: hypoactive BS with soft, mildly distended abdomen; nL>R lower quadrant tenderness +guarding  Extremities: no edema no cyanosis  Skin: no rashes  Ortho: no jt swelling  Neuro: AAO x 3      LABS/DIAGNOSTIC TESTS:                        11.2   10.4  )-----------( 193      ( 29 Mar 2018 07:10 )             32.9     WBC Count: 10.4 K/uL ( @ 07:10)  WBC Count: 9.2 K/uL ( @ 07:30)  WBC Count: 11.5 K/uL ( @ 23:34)        138  |  105  |  13  ----------------------------<  82  3.6   |  23  |  0.50    Ca    8.7      29 Mar 2018 07:10  Phos  3.4       Mg     2.0         TPro  7.1  /  Alb  3.0<L>  /  TBili  0.8  /  DBili  x   /  AST  11  /  ALT  12  /  AlkPhos  66      Urinalysis Basic - ( 27 Mar 2018 23:34 )    Color: Yellow / Appearance: Clear / S.010 / pH: x  Gluc: x / Ketone: Negative  / Bili: Negative / Urobili: Negative   Blood: x / Protein: Negative / Nitrite: Negative   Leuk Esterase: Negative / RBC: 2-5 /HPF / WBC 0-2 /HPF   Sq Epi: x / Non Sq Epi: x / Bacteria: Trace /HPF    LIVER FUNCTIONS - ( 29 Mar 2018 07:10 )  Alb: 3.0 g/dL / Pro: 7.1 g/dL / ALK PHOS: 66 U/L / ALT: 12 U/L DA / AST: 11 U/L / GGT: x           Hemoglobin A1C, Whole Blood: 9.5: Method: Immunoassay         Lactate, Blood: 1.7 mmol/L (18 @ 07:28)  Lactate, Blood: 2.3 mmol/L (18 @ 01:13)  Lactate, Blood: 2.9 mmol/L (18 @ 23:34)    Rapid Respiratory Viral Panel (18 @ 01:58)    Rapid RVP Result: NotDetec      CULTURES:   .Blood Blood-Peripheral   @ 10:12   No growth to date.  --  --      .Urine Clean Catch (Midstream)   @ 10:06   <10,000 CFU/ml Normal Urogenital adolfo present  --  --      RADIOLOGY:  EXAM:  CT ABDOMEN AND PELVIS IC                        PROCEDURE DATE:  2018    INTERPRETATION:  CT of the abdomen and pelvis with IV contrast    Clinical Indication: Lower quadrant abdominal pain    Technique: Axial multidetector CT images of the abdomen and pelvis are   acquired following the administration of oral and IV contrast (94 cc   Omnipaque-350 administered, 6 cc discarded).    Comparison: None.    Findings:    Abdomen: Limited sections through the lung bases demonstrate a small   hiatal hernia. Small bilateral atelectasis. Mildly dilated common bile   duct at 9 mm in caliber. Slight dilatation of the main pancreatic duct   measuring 4 mm in caliber at the pancreatic head. No evidence for a   pancreatic mass at CT.    The liver, gallbladder, spleen, and adrenals appear unremarkable. Small   hypodense lesions in the kidneys bilaterally, too small to characterize.   Extrarenal pelvises bilaterally.    The appendix appears normal. Colonic diverticulosis. No evidence for   bowel obstruction. No evidence for free air in the upper abdomen. No   ascites or enlarged lymph node.    Pelvis: Apparent focal mural thickening at the proximal sigmoid colon   with adjacent stranding and diverticulosis, compatible with   diverticulitis. No evidence for a drainable abscess. Small extraluminal   air from a contained microperforation versus diverticulum adjacent to the   abnormal proximal sigmoid colon (image 45 series 602). No pelvic free   fluid, or enlarged lymph node.Apparent mural thickening at the urinary   bladder dome. The uterus is not visualized, probably surgically absent.    Impression: Mildly dilated common bile duct. Slight dilatation of the   main pancreatic duct in the pancreatic head. Although there is no   evidence for a pancreatic mass at CT, a small obstructive pancreatic head   mass or ampullary tumor cannot be excluded. If clinically indicated,   abdominal MR without and with IV contrast including MRCP may be pursued   for further evaluation.    Apparent focal mural thickening at the proximal sigmoid colon with   adjacent stranding and diverticulosis, compatible with diverticulitis. No   evidence for a drainable abscess. Small extraluminal air from a contained   microperforation versus adiverticulum adjacent to the abnormal proximal   ascending colon.    Apparent mural thickening at the urinary bladder dome. This may be due to   cystitis or urinary bladder cancer. Clinical correlation is recommended.    Other findings as above.    A preliminary report was provided by [employer].         EXAM:  XR CHEST AP OR PA 1V                        PROCEDURE DATE:  2018    INTERPRETATION:  INDICATION: Shortness of Breath    PRIORS: None    VIEWS: Portable AP radiography of the chest performed.    FINDINGS: Heart size appears within normal limits. No hilar or superior   mediastinal abnormalities are identified. There is no evidence for focal   infiltrate, lobar consolidation or pulmonary edema. No pleural effusion   or pneumothorax is demonstrated. No mediastinal shift is noted. The   visualized osseous structures appear unremarkable. An identified safety   pin and key are presumed extrinsic to the patient; clinical correlation   suggested.    IMPRESSION: No evidence for focal infiltrate or lobar consolidation. HPI:  ID consult was called to evaluate this 71 y/o female from home for acute perforated diverticulitis with microperf.  Patient has pmhx significant for uncontrolled DM.  Patient presented to [facility] yesterday 2/2 c/o chills.  +CT A/P with findings of acute diverticulitis of proximal sigmoid colon with contained microperforation, no abscess noted.  Patient had fevers spikes yesterday of 100.5F and 101.8F.  Cultures are negative so far.  Leukocytosis has resolved and lactate levels have improved.    As per H&P:  71 y/o F with PMHx of DM and HLD came in with c/o chills today. She said her whole body started shaking along with body aches. She denies taking any medication prior to coming in. She denies cough, runny nose, or itchy eyes. Denies congestion. Denies abdominal pain, nausea or vomiting. Denies dysuria, or increased urinary frequency. No other complaints at this time. No sick contacts. No recent travels. Full code. (28 Mar 2018 05:36)    REVIEW OF SYSTEMS:  [  ] Not able to illicit  General: no fevers no malaise   Chest: no cough no sob no CP  GI: no nvd +abdominal pain  : no urinary symptoms   Skin: no rashes no cyanosis  Musculoskeletal: no trauma no LBP  Neuro: +ha's +dizziness     PAST MEDICAL & SURGICAL HISTORY:  Hypercholesteremia  DM (diabetes mellitus)  No significant past surgical history    ALLERGIES: penicillin (Rash)    MEDS:  acetaminophen  IVPB. 1000 milliGRAM(s) IV Intermittent once PRN  aspirin  chewable 81 milliGRAM(s) Oral daily  dextrose 5%. 1000 milliLiter(s) IV Continuous <Continuous>  dextrose 50% Injectable 12.5 Gram(s) IV Push once  dextrose 50% Injectable 25 Gram(s) IV Push once  dextrose 50% Injectable 25 Gram(s) IV Push once  dextrose Gel 1 Dose(s) Oral once PRN  enoxaparin Injectable 40 milliGRAM(s) SubCutaneous daily  glucagon  Injectable 1 milliGRAM(s) IntraMuscular once PRN  insulin lispro (HumaLOG) corrective regimen sliding scale   SubCutaneous three times a day before meals  piperacillin/tazobactam IVPB. 3.375 Gram(s) IV Intermittent every 8 hours (3/29)  sodium chloride 0.9%. 1000 milliLiter(s) IV Continuous <Continuous>    SOCIAL HISTORY:  Lives with family, ambulates independently. Denies smoking, alcohol or illicit drug use.    FAMILY HISTORY: DM - mother    VITALS:  Vital Signs Last 24 Hrs  T(C): 36.6 (29 Mar 2018 04:51), Max: 38.8 (28 Mar 2018 16:56)  T(F): 97.9 (29 Mar 2018 04:51), Max: 101.8 (28 Mar 2018 16:56)  HR: 91 (29 Mar 2018 04:51) (91 - 105)  BP: 118/74 (29 Mar 2018 04:51) (111/65 - 134/57)  BP(mean): --  RR: 16 (29 Mar 2018 04:51) (16 - 18)  SpO2: 97% (29 Mar 2018 04:51) (97% - 97%)      PHYSICAL EXAM:  Constitutional: well developed elderly female in NAD  HEENT: normocephalic with moist oral mucosa  Neck: supple no LN's no JVD  Respiratory: lungs clear no rales no rhonchi  Cardiovascular: S1 S2 reg no murmurs  Gastrointestinal: hypoactive BS with soft, mildly distended abdomen; L>R lower quadrant tenderness with slight guarding on left  Extremities: no edema no cyanosis  Skin: no rashes  Ortho: no jt swelling  Neuro: AAO x 3      LABS/DIAGNOSTIC TESTS:                        11.2   10.4  )-----------( 193      ( 29 Mar 2018 07:10 )             32.9     WBC Count: 10.4 K/uL ( @ 07:10)  WBC Count: 9.2 K/uL ( @ 07:30)  WBC Count: 11.5 K/uL ( @ 23:34)        138  |  105  |  13  ----------------------------<  82  3.6   |  23  |  0.50    Ca    8.7      29 Mar 2018 07:10  Phos  3.4       Mg     2.0         TPro  7.1  /  Alb  3.0<L>  /  TBili  0.8  /  DBili  x   /  AST  11  /  ALT  12  /  AlkPhos  66      Urinalysis Basic - ( 27 Mar 2018 23:34 )    Color: Yellow / Appearance: Clear / S.010 / pH: x  Gluc: x / Ketone: Negative  / Bili: Negative / Urobili: Negative   Blood: x / Protein: Negative / Nitrite: Negative   Leuk Esterase: Negative / RBC: 2-5 /HPF / WBC 0-2 /HPF   Sq Epi: x / Non Sq Epi: x / Bacteria: Trace /HPF    LIVER FUNCTIONS - ( 29 Mar 2018 07:10 )  Alb: 3.0 g/dL / Pro: 7.1 g/dL / ALK PHOS: 66 U/L / ALT: 12 U/L DA / AST: 11 U/L / GGT: x           Hemoglobin A1C, Whole Blood: 9.5: Method: Immunoassay         Lactate, Blood: 1.7 mmol/L (18 @ 07:28)  Lactate, Blood: 2.3 mmol/L (18 @ 01:13)  Lactate, Blood: 2.9 mmol/L (18 @ 23:34)    Rapid Respiratory Viral Panel (18 @ 01:58)    Rapid RVP Result: NotDetec      CULTURES:   .Blood Blood-Peripheral   @ 10:12   No growth to date.  --  --      .Urine Clean Catch (Midstream)   @ 10:06   <10,000 CFU/ml Normal Urogenital adolfo present  --  --      RADIOLOGY:  EXAM:  CT ABDOMEN AND PELVIS IC                        PROCEDURE DATE:  2018    INTERPRETATION:  CT of the abdomen and pelvis with IV contrast    Clinical Indication: Lower quadrant abdominal pain    Technique: Axial multidetector CT images of the abdomen and pelvis are   acquired following the administration of oral and IV contrast (94 cc   Omnipaque-350 administered, 6 cc discarded).    Comparison: None.    Findings:    Abdomen: Limited sections through the lung bases demonstrate a small   hiatal hernia. Small bilateral atelectasis. Mildly dilated common bile   duct at 9 mm in caliber. Slight dilatation of the main pancreatic duct   measuring 4 mm in caliber at the pancreatic head. No evidence for a   pancreatic mass at CT.    The liver, gallbladder, spleen, and adrenals appear unremarkable. Small   hypodense lesions in the kidneys bilaterally, too small to characterize.   Extrarenal pelvises bilaterally.    The appendix appears normal. Colonic diverticulosis. No evidence for   bowel obstruction. No evidence for free air in the upper abdomen. No   ascites or enlarged lymph node.    Pelvis: Apparent focal mural thickening at the proximal sigmoid colon   with adjacent stranding and diverticulosis, compatible with   diverticulitis. No evidence for a drainable abscess. Small extraluminal   air from a contained microperforation versus diverticulum adjacent to the   abnormal proximal sigmoid colon (image 45 series 602). No pelvic free   fluid, or enlarged lymph node.Apparent mural thickening at the urinary   bladder dome. The uterus is not visualized, probably surgically absent.    Impression: Mildly dilated common bile duct. Slight dilatation of the   main pancreatic duct in the pancreatic head. Although there is no   evidence for a pancreatic mass at CT, a small obstructive pancreatic head   mass or ampullary tumor cannot be excluded. If clinically indicated,   abdominal MR without and with IV contrast including MRCP may be pursued   for further evaluation.    Apparent focal mural thickening at the proximal sigmoid colon with   adjacent stranding and diverticulosis, compatible with diverticulitis. No   evidence for a drainable abscess. Small extraluminal air from a contained   microperforation versus adiverticulum adjacent to the abnormal proximal   ascending colon.    Apparent mural thickening at the urinary bladder dome. This may be due to   cystitis or urinary bladder cancer. Clinical correlation is recommended.    Other findings as above.    A preliminary report was provided by [employer].         EXAM:  XR CHEST AP OR PA 1V                        PROCEDURE DATE:  2018    INTERPRETATION:  INDICATION: Shortness of Breath    PRIORS: None    VIEWS: Portable AP radiography of the chest performed.    FINDINGS: Heart size appears within normal limits. No hilar or superior   mediastinal abnormalities are identified. There is no evidence for focal   infiltrate, lobar consolidation or pulmonary edema. No pleural effusion   or pneumothorax is demonstrated. No mediastinal shift is noted. The   visualized osseous structures appear unremarkable. An identified safety   pin and key are presumed extrinsic to the patient; clinical correlation   suggested.    IMPRESSION: No evidence for focal infiltrate or lobar consolidation.

## 2018-03-29 NOTE — PROGRESS NOTE ADULT - SUBJECTIVE AND OBJECTIVE BOX
Patient is a 72 year-old female with PMH of DM, HLD who presented with chills and is admitted for sigmoid diverticulitis with localized perforation      INTERVAL HPI/OVERNIGHT EVENTS: No events overnight. Patient seen and examined at bedside. She reports that abdominal pain has improved.       T(C): 37.3 (18 @ 13:28), Max: 38.8 (18 @ 16:56)  HR: 82 (18 @ 13:28) (82 - 105)  BP: 115/73 (18 @ 13:28) (111/65 - 134/57)  RR: 15 (18 @ 13:28) (15 - 18)  SpO2: 96% (18 @ 13:28) (96% - 97%)  Wt(kg): --  I&O's Summary      REVIEW OF SYSTEMS: denies fever, chills, SOB, palpitations, chest pain, dizziness    MEDICATIONS  (STANDING):  aspirin  chewable 81 milliGRAM(s) Oral daily  dextrose 5%. 1000 milliLiter(s) (50 mL/Hr) IV Continuous <Continuous>  dextrose 50% Injectable 12.5 Gram(s) IV Push once  dextrose 50% Injectable 25 Gram(s) IV Push once  dextrose 50% Injectable 25 Gram(s) IV Push once  enoxaparin Injectable 40 milliGRAM(s) SubCutaneous daily  insulin lispro (HumaLOG) corrective regimen sliding scale   SubCutaneous three times a day before meals  piperacillin/tazobactam IVPB. 3.375 Gram(s) IV Intermittent every 8 hours  sodium chloride 0.9%. 1000 milliLiter(s) (100 mL/Hr) IV Continuous <Continuous>    MEDICATIONS  (PRN):  dextrose Gel 1 Dose(s) Oral once PRN Blood Glucose LESS THAN 70 milliGRAM(s)/deciliter  glucagon  Injectable 1 milliGRAM(s) IntraMuscular once PRN Glucose LESS THAN 70 milligrams/deciliter      PHYSICAL EXAM:  GENERAL: no distress, well developed, well-groomed   HEAD:  Atraumatic, Normocephalic  EYES: PERRLA  ENMT: moist mucus membranes  NECK: Supple  NERVOUS SYSTEM:  Alert & Oriented X3, no focal deficits   CHEST/LUNG: clear to ausculation bilaterally   HEART: Regular rate and rhythm; No murmurs, rubs, or gallops  ABDOMEN: Soft, nondistended, tenderness to palpation LLQ   EXTREMITIES:  2+ Peripheral Pulses, No clubbing, cyanosis, or edema  SKIN: No rashes or lesions    LABS:                        11.2   10.4  )-----------( 193      ( 29 Mar 2018 07:10 )             32.9         138  |  105  |  13  ----------------------------<  82  3.6   |  23  |  0.50    Ca    8.7      29 Mar 2018 07:10  Phos  3.4       Mg     2.0         TPro  7.1  /  Alb  3.0<L>  /  TBili  0.8  /  DBili  x   /  AST  11  /  ALT  12  /  AlkPhos  66        Urinalysis Basic - ( 27 Mar 2018 23:34 )    Color: Yellow / Appearance: Clear / S.010 / pH: x  Gluc: x / Ketone: Negative  / Bili: Negative / Urobili: Negative   Blood: x / Protein: Negative / Nitrite: Negative   Leuk Esterase: Negative / RBC: 2-5 /HPF / WBC 0-2 /HPF   Sq Epi: x / Non Sq Epi: x / Bacteria: Trace /HPF      CAPILLARY BLOOD GLUCOSE      POCT Blood Glucose.: 96 mg/dL (29 Mar 2018 11:24)  POCT Blood Glucose.: 95 mg/dL (29 Mar 2018 08:53)  POCT Blood Glucose.: 93 mg/dL (29 Mar 2018 03:58)  POCT Blood Glucose.: 175 mg/dL (28 Mar 2018 17:40)        Urinalysis Basic - ( 27 Mar 2018 23:34 )    Color: Yellow / Appearance: Clear / S.010 / pH: x  Gluc: x / Ketone: Negative  / Bili: Negative / Urobili: Negative   Blood: x / Protein: Negative / Nitrite: Negative   Leuk Esterase: Negative / RBC: 2-5 /HPF / WBC 0-2 /HPF   Sq Epi: x / Non Sq Epi: x / Bacteria: Trace /HPF Patient is a 72 year-old female with PMH of DM, HLD who presented with chills and is admitted for sigmoid diverticulitis with localized perforation      INTERVAL HPI/OVERNIGHT EVENTS: No events overnight. Patient seen and examined at bedside. She reports that abdominal pain has improved.     MEDICATIONS  (STANDING):  aspirin  chewable 81 milliGRAM(s) Oral daily  dextrose 5%. 1000 milliLiter(s) (50 mL/Hr) IV Continuous <Continuous>  dextrose 50% Injectable 12.5 Gram(s) IV Push once  dextrose 50% Injectable 25 Gram(s) IV Push once  dextrose 50% Injectable 25 Gram(s) IV Push once  enoxaparin Injectable 40 milliGRAM(s) SubCutaneous daily  insulin lispro (HumaLOG) corrective regimen sliding scale   SubCutaneous three times a day before meals  piperacillin/tazobactam IVPB. 3.375 Gram(s) IV Intermittent every 8 hours  sodium chloride 0.9%. 1000 milliLiter(s) (100 mL/Hr) IV Continuous <Continuous>    MEDICATIONS  (PRN):  dextrose Gel 1 Dose(s) Oral once PRN Blood Glucose LESS THAN 70 milliGRAM(s)/deciliter  glucagon  Injectable 1 milliGRAM(s) IntraMuscular once PRN Glucose LESS THAN 70 milligrams/deciliter        T(C): 37.3 (18 @ 13:28), Max: 38.8 (18 @ 16:56)  HR: 82 (18 @ 13:28) (82 - 105)  BP: 115/73 (18 @ 13:28) (111/65 - 134/57)  RR: 15 (18 @ 13:28) (15 - 18)  SpO2: 96% (18 @ 13:28) (96% - 97%)        REVIEW OF SYSTEMS: denies fever, chills, SOB, palpitations, chest pain, dizziness  All other ROS are negative    PHYSICAL EXAM:  GENERAL: no distress, well developed, well-groomed   HEAD:  Atraumatic, Normocephalic  EYES: PERRL  ENMT: moist mucus membranes  NECK: Supple  NERVOUS SYSTEM:  Alert & Oriented X3, no focal deficits   CHEST/LUNG: clear to ausculation bilaterally   HEART: Regular rate and rhythm; No murmurs, rubs, or gallops  ABDOMEN: Soft, nondistended, tenderness to palpation LLQ   EXTREMITIES:  2+ Peripheral Pulses, No clubbing, cyanosis, or edema  SKIN: No rashes or lesions    LABS:                        11.2   10.4  )-----------( 193      ( 29 Mar 2018 07:10 )             32.9         138  |  105  |  13  ----------------------------<  82  3.6   |  23  |  0.50    Ca    8.7      29 Mar 2018 07:10  Phos  3.4       Mg     2.0         TPro  7.1  /  Alb  3.0<L>  /  TBili  0.8  /  DBili  x   /  AST  11  /  ALT  12  /  AlkPhos  66        Urinalysis Basic - ( 27 Mar 2018 23:34 )    Color: Yellow / Appearance: Clear / S.010 / pH: x  Gluc: x / Ketone: Negative  / Bili: Negative / Urobili: Negative   Blood: x / Protein: Negative / Nitrite: Negative   Leuk Esterase: Negative / RBC: 2-5 /HPF / WBC 0-2 /HPF   Sq Epi: x / Non Sq Epi: x / Bacteria: Trace /HPF      CAPILLARY BLOOD GLUCOSE      POCT Blood Glucose.: 96 mg/dL (29 Mar 2018 11:24)  POCT Blood Glucose.: 95 mg/dL (29 Mar 2018 08:53)  POCT Blood Glucose.: 93 mg/dL (29 Mar 2018 03:58)  POCT Blood Glucose.: 175 mg/dL (28 Mar 2018 17:40)        Urinalysis Basic - ( 27 Mar 2018 23:34 )    Color: Yellow / Appearance: Clear / S.010 / pH: x  Gluc: x / Ketone: Negative  / Bili: Negative / Urobili: Negative   Blood: x / Protein: Negative / Nitrite: Negative   Leuk Esterase: Negative / RBC: 2-5 /HPF / WBC 0-2 /HPF   Sq Epi: x / Non Sq Epi: x / Bacteria: Trace /HPF

## 2018-03-29 NOTE — CONSULT NOTE ADULT - CONSULT REASON
Acute perforated diverticulitis with contained microperforation, fevers, leukocytosis
LLQ pain/diverticulitis

## 2018-03-29 NOTE — PROGRESS NOTE ADULT - SUBJECTIVE AND OBJECTIVE BOX
INTERVAL HPI/OVERNIGHT EVENTS:  Pt stable.   NPO  flatus/BM.  Pt states abdominal pain is improved    Vital Signs Last 24 Hrs  T(C): 36.6 (29 Mar 2018 04:51), Max: 38.8 (28 Mar 2018 16:56)  T(F): 97.9 (29 Mar 2018 04:51), Max: 101.8 (28 Mar 2018 16:56)  HR: 91 (29 Mar 2018 04:51) (91 - 105)  BP: 118/74 (29 Mar 2018 04:51) (111/65 - 134/57)  BP(mean): --  RR: 16 (29 Mar 2018 04:51) (16 - 18)  SpO2: 97% (29 Mar 2018 04:51) (97% - 97%)    Physical:  Abdomen: Soft nondistended, marked LLQ tenderness  No palpable masses  Rest of abdomen benign    I&O's Summary      LABS:                        11.2   10.4  )-----------( 193      ( 29 Mar 2018 07:10 )             32.9             03-29    138  |  105  |  13  ----------------------------<  82  3.6   |  23  |  0.50    Ca    8.7      29 Mar 2018 07:10  Phos  3.4     03-29  Mg     2.0     03-29    TPro  7.1  /  Alb  3.0<L>  /  TBili  0.8  /  DBili  x   /  AST  11  /  ALT  12  /  AlkPhos  66  03-29

## 2018-03-29 NOTE — PROGRESS NOTE ADULT - PROBLEM SELECTOR PLAN 1
- continue zosyn (day 1)  - serial abdominal exams- thus far no peritoneal signs  - - continue zosyn (day 1)  - serial abdominal exams- thus far no peritoneal signs  - will need repeat CT of the abdomen in 2 days  - blood cultures negative to date  - surgery follow-up  - ID : Dr. Elliott - continue zosyn (day 1), tolerated   - serial abdominal exams- thus far no peritoneal signs  - will need repeat CT of the abdomen in 2 days  - blood cultures negative to date  - surgery follow-up  - ID : Dr. Elliott

## 2018-03-29 NOTE — PHYSICAL THERAPY INITIAL EVALUATION ADULT - PATIENT PROFILE REVIEW, REHAB EVAL
inc. labs and imaging. pt. stated clearly in English that she prefers to speak with PT in English. Free interp. services were offered an declined./yes

## 2018-03-29 NOTE — PROGRESS NOTE ADULT - PROBLEM SELECTOR PLAN 2
- continue d5 via IV as patient is NPO  - HbA1c is 9.8, will need lantus and tighter glycemic control when patient's diet is advanced   - follow accuchecks  - continue humalog sliding scale

## 2018-03-29 NOTE — PROGRESS NOTE ADULT - SUBJECTIVE AND OBJECTIVE BOX
Pt seen at bedside  Patient is a 72y old  Female who presents with a chief complaint of chills (28 Mar 2018 11:05)      INTERVAL HPI/OVERNIGHT EVENTS:  Pt c/o abdominal pain but improved since admission.  NO flatus or BM  Feels abdomen is enlarged  Denies nuase, vomiting, fever or chills    Vital Signs Last 24 Hrs  T(C): 36.6 (29 Mar 2018 04:51), Max: 38.8 (28 Mar 2018 16:56)  T(F): 97.9 (29 Mar 2018 04:51), Max: 101.8 (28 Mar 2018 16:56)  HR: 91 (29 Mar 2018 04:51) (87 - 105)  BP: 118/74 (29 Mar 2018 04:51) (111/65 - 134/57)  BP(mean): --  RR: 16 (29 Mar 2018 04:51) (16 - 18)  SpO2: 97% (29 Mar 2018 04:51) (97% - 97%)    Physical Exam:    Gen: awake, alert oriented NAD  Abd: soft distended, + tenderness LLQ, no guarding or rebound      MEDICATIONS  (STANDING):  aspirin  chewable 81 milliGRAM(s) Oral daily  dextrose 5%. 1000 milliLiter(s) (50 mL/Hr) IV Continuous <Continuous>  dextrose 50% Injectable 12.5 Gram(s) IV Push once  dextrose 50% Injectable 25 Gram(s) IV Push once  dextrose 50% Injectable 25 Gram(s) IV Push once  enoxaparin Injectable 40 milliGRAM(s) SubCutaneous daily  insulin lispro (HumaLOG) corrective regimen sliding scale   SubCutaneous three times a day before meals  lisinopril 2.5 milliGRAM(s) Oral daily  piperacillin/tazobactam IVPB. 3.375 Gram(s) IV Intermittent every 8 hours  simvastatin 10 milliGRAM(s) Oral at bedtime  sodium chloride 0.9%. 1000 milliLiter(s) (100 mL/Hr) IV Continuous <Continuous>    MEDICATIONS  (PRN):  dextrose Gel 1 Dose(s) Oral once PRN Blood Glucose LESS THAN 70 milliGRAM(s)/deciliter  glucagon  Injectable 1 milliGRAM(s) IntraMuscular once PRN Glucose LESS THAN 70 milligrams/deciliter                            11.2   10.4  )-----------( 193      ( 29 Mar 2018 07:10 )             32.9     03-29    138  |  105  |  13  ----------------------------<  82  3.6   |  23  |  0.50    Ca    8.7      29 Mar 2018 07:10  Phos  3.4     03-29  Mg     2.0     03-29    TPro  7.1  /  Alb  3.0<L>  /  TBili  0.8  /  DBili  x   /  AST  11  /  ALT  12  /  AlkPhos  66  03-29

## 2018-03-29 NOTE — CONSULT NOTE ADULT - ASSESSMENT
1.	Acute perforated diverticulitis with contained microperforation   2.	Fevers  3.	Leukocytosis - resolved  ·	cont zosyn 3.375gm IV q8h  ·	will need repeat CT A/P in 2-3 days 1.	Acute perforated diverticulitis with contained microperforation   2.	Fevers  3.	Leukocytosis - resolved  ·	cont zosyn 3.375gm IV q8h  ·	keep NPO  ·	will need repeat CT A/P in 2-3 days 1.	Acute perforated diverticulitis with contained microperforation   2.	Fevers  3.	Leukocytosis - resolved  ·	cont zosyn 3.375gm IV q8h  ·	keep NPO until Saturday  ·	will need repeat CT A/P on Saturday 3/31 1.	Acute perforated diverticulitis with contained microperforation   2.	Fevers  3.	Leukocytosis - resolved  4.	peritonitis  ·	cont zosyn 3.375gm IV q8h  ·	keep NPO until Saturday  ·	will need repeat CT A/P on Saturday 3/31

## 2018-03-30 LAB
ANION GAP SERPL CALC-SCNC: 10 MMOL/L — SIGNIFICANT CHANGE UP (ref 5–17)
BASOPHILS # BLD AUTO: 0.1 K/UL — SIGNIFICANT CHANGE UP (ref 0–0.2)
BASOPHILS NFR BLD AUTO: 0.8 % — SIGNIFICANT CHANGE UP (ref 0–2)
BUN SERPL-MCNC: 19 MG/DL — HIGH (ref 7–18)
CALCIUM SERPL-MCNC: 8.5 MG/DL — SIGNIFICANT CHANGE UP (ref 8.4–10.5)
CHLORIDE SERPL-SCNC: 108 MMOL/L — SIGNIFICANT CHANGE UP (ref 96–108)
CO2 SERPL-SCNC: 20 MMOL/L — LOW (ref 22–31)
CREAT SERPL-MCNC: 0.52 MG/DL — SIGNIFICANT CHANGE UP (ref 0.5–1.3)
EOSINOPHIL # BLD AUTO: 0.1 K/UL — SIGNIFICANT CHANGE UP (ref 0–0.5)
EOSINOPHIL NFR BLD AUTO: 0.7 % — SIGNIFICANT CHANGE UP (ref 0–6)
GLUCOSE SERPL-MCNC: 105 MG/DL — HIGH (ref 70–99)
HCT VFR BLD CALC: 34.8 % — SIGNIFICANT CHANGE UP (ref 34.5–45)
HGB BLD-MCNC: 11 G/DL — LOW (ref 11.5–15.5)
LYMPHOCYTES # BLD AUTO: 1.3 K/UL — SIGNIFICANT CHANGE UP (ref 1–3.3)
LYMPHOCYTES # BLD AUTO: 15.8 % — SIGNIFICANT CHANGE UP (ref 13–44)
MAGNESIUM SERPL-MCNC: 2.2 MG/DL — SIGNIFICANT CHANGE UP (ref 1.6–2.6)
MCHC RBC-ENTMCNC: 28.2 PG — SIGNIFICANT CHANGE UP (ref 27–34)
MCHC RBC-ENTMCNC: 31.7 GM/DL — LOW (ref 32–36)
MCV RBC AUTO: 88.9 FL — SIGNIFICANT CHANGE UP (ref 80–100)
MONOCYTES # BLD AUTO: 0.6 K/UL — SIGNIFICANT CHANGE UP (ref 0–0.9)
MONOCYTES NFR BLD AUTO: 7.4 % — SIGNIFICANT CHANGE UP (ref 2–14)
NEUTROPHILS # BLD AUTO: 6.1 K/UL — SIGNIFICANT CHANGE UP (ref 1.8–7.4)
NEUTROPHILS NFR BLD AUTO: 75.3 % — SIGNIFICANT CHANGE UP (ref 43–77)
PHOSPHATE SERPL-MCNC: 2.2 MG/DL — LOW (ref 2.5–4.5)
PLATELET # BLD AUTO: 210 K/UL — SIGNIFICANT CHANGE UP (ref 150–400)
POTASSIUM SERPL-MCNC: 3.7 MMOL/L — SIGNIFICANT CHANGE UP (ref 3.5–5.3)
POTASSIUM SERPL-SCNC: 3.7 MMOL/L — SIGNIFICANT CHANGE UP (ref 3.5–5.3)
RBC # BLD: 3.92 M/UL — SIGNIFICANT CHANGE UP (ref 3.8–5.2)
RBC # FLD: 12.6 % — SIGNIFICANT CHANGE UP (ref 10.3–14.5)
SODIUM SERPL-SCNC: 138 MMOL/L — SIGNIFICANT CHANGE UP (ref 135–145)
WBC # BLD: 8.1 K/UL — SIGNIFICANT CHANGE UP (ref 3.8–10.5)
WBC # FLD AUTO: 8.1 K/UL — SIGNIFICANT CHANGE UP (ref 3.8–10.5)

## 2018-03-30 RX ORDER — SODIUM CHLORIDE 9 MG/ML
1000 INJECTION INTRAMUSCULAR; INTRAVENOUS; SUBCUTANEOUS
Refills: 0 | Status: DISCONTINUED | OUTPATIENT
Start: 2018-03-30 | End: 2018-04-02

## 2018-03-30 RX ORDER — POTASSIUM PHOSPHATE, MONOBASIC POTASSIUM PHOSPHATE, DIBASIC 236; 224 MG/ML; MG/ML
15 INJECTION, SOLUTION INTRAVENOUS ONCE
Refills: 0 | Status: COMPLETED | OUTPATIENT
Start: 2018-03-30 | End: 2018-03-30

## 2018-03-30 RX ADMIN — SODIUM CHLORIDE 75 MILLILITER(S): 9 INJECTION INTRAMUSCULAR; INTRAVENOUS; SUBCUTANEOUS at 21:42

## 2018-03-30 RX ADMIN — PIPERACILLIN AND TAZOBACTAM 25 GRAM(S): 4; .5 INJECTION, POWDER, LYOPHILIZED, FOR SOLUTION INTRAVENOUS at 18:29

## 2018-03-30 RX ADMIN — ENOXAPARIN SODIUM 40 MILLIGRAM(S): 100 INJECTION SUBCUTANEOUS at 12:23

## 2018-03-30 RX ADMIN — Medication 81 MILLIGRAM(S): at 12:23

## 2018-03-30 RX ADMIN — POTASSIUM PHOSPHATE, MONOBASIC POTASSIUM PHOSPHATE, DIBASIC 62.5 MILLIMOLE(S): 236; 224 INJECTION, SOLUTION INTRAVENOUS at 08:34

## 2018-03-30 RX ADMIN — PIPERACILLIN AND TAZOBACTAM 25 GRAM(S): 4; .5 INJECTION, POWDER, LYOPHILIZED, FOR SOLUTION INTRAVENOUS at 01:47

## 2018-03-30 RX ADMIN — PIPERACILLIN AND TAZOBACTAM 25 GRAM(S): 4; .5 INJECTION, POWDER, LYOPHILIZED, FOR SOLUTION INTRAVENOUS at 09:40

## 2018-03-30 NOTE — PROGRESS NOTE ADULT - SUBJECTIVE AND OBJECTIVE BOX
Patient is a 72 year-old female with PMH of DM, HLD who presented with chills and is admitted for sigmoid diverticulitis with localized perforation.      INTERVAL HPI/OVERNIGHT EVENTS: No events overnight. Patient seen and examined at bedside. She reports that abdominal pain has improved but still has tenderness when its palpated.     MEDICATIONS  (STANDING):  aspirin  chewable 81 milliGRAM(s) Oral daily  dextrose 5% + sodium chloride 0.45%. 1000 milliLiter(s) (50 mL/Hr) IV Continuous <Continuous>  dextrose 5%. 1000 milliLiter(s) (50 mL/Hr) IV Continuous <Continuous>  dextrose 50% Injectable 12.5 Gram(s) IV Push once  dextrose 50% Injectable 25 Gram(s) IV Push once  dextrose 50% Injectable 25 Gram(s) IV Push once  enoxaparin Injectable 40 milliGRAM(s) SubCutaneous daily  insulin lispro (HumaLOG) corrective regimen sliding scale   SubCutaneous three times a day before meals  piperacillin/tazobactam IVPB. 3.375 Gram(s) IV Intermittent every 8 hours  sodium chloride 0.9%. 1000 milliLiter(s) (100 mL/Hr) IV Continuous <Continuous>    MEDICATIONS  (PRN):  dextrose Gel 1 Dose(s) Oral once PRN Blood Glucose LESS THAN 70 milliGRAM(s)/deciliter  glucagon  Injectable 1 milliGRAM(s) IntraMuscular once PRN Glucose LESS THAN 70 milligrams/deciliter        Vital Signs Last 24 Hrs  T(C): 36.6 (30 Mar 2018 05:02), Max: 37.3 (29 Mar 2018 13:28)  T(F): 97.9 (30 Mar 2018 05:02), Max: 99.1 (29 Mar 2018 13:28)  HR: 77 (30 Mar 2018 05:02) (77 - 82)  BP: 106/70 (30 Mar 2018 05:02) (99/61 - 115/73)  BP(mean): --  RR: 16 (30 Mar 2018 05:02) (15 - 16)  SpO2: 98% (30 Mar 2018 05:02) (96% - 98%)        REVIEW OF SYSTEMS: denies fever, chills, SOB, palpitations, chest pain, dizziness  All other ROS are negative    PHYSICAL EXAM:  GENERAL: no distress, well developed, well-groomed   HEAD:  Atraumatic, Normocephalic  EYES: PERRL  ENMT: moist mucus membranes  NECK: Supple  NERVOUS SYSTEM:  Alert & Oriented X3, no focal deficits   CHEST/LUNG: clear to ausculation bilaterally   HEART: Regular rate and rhythm; No murmurs, rubs, or gallops  ABDOMEN: Soft, nondistended, tenderness to palpation LLQ   EXTREMITIES:  2+ Peripheral Pulses, No clubbing, cyanosis, or edema  SKIN: No rashes or lesions    LABS:                                 11.0   8.1   )-----------( 210      ( 30 Mar 2018 07:01 )             34.8             03-30    138  |  108  |  19<H>  ----------------------------<  105<H>  3.7   |  20<L>  |  0.52    Ca    8.5      30 Mar 2018 07:01  Phos  2.2     03-30  Mg     2.2     03-30    TPro  7.1  /  Alb  3.0<L>  /  TBili  0.8  /  DBili  x   /  AST  11  /  ALT  12  /  AlkPhos  66  03-29 Patient is a 72 year-old female with PMH of DM, HLD who presented with chills and is admitted for sigmoid diverticulitis with localized perforation.      INTERVAL HPI/OVERNIGHT EVENTS: No events overnight. Patient seen and examined at bedside. She reports that abdominal pain has improved but still has tenderness when its palpated.     MEDICATIONS  (STANDING):  aspirin  chewable 81 milliGRAM(s) Oral daily  dextrose 5% + sodium chloride 0.45%. 1000 milliLiter(s) (50 mL/Hr) IV Continuous <Continuous>  dextrose 5%. 1000 milliLiter(s) (50 mL/Hr) IV Continuous <Continuous>  dextrose 50% Injectable 12.5 Gram(s) IV Push once  dextrose 50% Injectable 25 Gram(s) IV Push once  dextrose 50% Injectable 25 Gram(s) IV Push once  enoxaparin Injectable 40 milliGRAM(s) SubCutaneous daily  insulin lispro (HumaLOG) corrective regimen sliding scale   SubCutaneous three times a day before meals  piperacillin/tazobactam IVPB. 3.375 Gram(s) IV Intermittent every 8 hours  sodium chloride 0.9%. 1000 milliLiter(s) (100 mL/Hr) IV Continuous <Continuous>    MEDICATIONS  (PRN):  dextrose Gel 1 Dose(s) Oral once PRN Blood Glucose LESS THAN 70 milliGRAM(s)/deciliter  glucagon  Injectable 1 milliGRAM(s) IntraMuscular once PRN Glucose LESS THAN 70 milligrams/deciliter        Vital Signs Last 24 Hrs  T(C): 36.6 (30 Mar 2018 05:02), Max: 37.3 (29 Mar 2018 13:28)  T(F): 97.9 (30 Mar 2018 05:02), Max: 99.1 (29 Mar 2018 13:28)  HR: 77 (30 Mar 2018 05:02) (77 - 82)  BP: 106/70 (30 Mar 2018 05:02) (99/61 - 115/73)  RR: 16 (30 Mar 2018 05:02) (15 - 16)  SpO2: 98% (30 Mar 2018 05:02) (96% - 98%)        REVIEW OF SYSTEMS: denies fever, chills, SOB, palpitations, chest pain, dizziness  All other ROS are negative    PHYSICAL EXAM:  GENERAL: no distress, well developed, well-groomed   HEAD:  Atraumatic, Normocephalic  EYES: PERRL  ENMT: moist mucus membranes  NECK: Supple  NERVOUS SYSTEM:  Alert & Oriented X3, no focal deficits   CHEST/LUNG: clear to ausculation bilaterally   HEART: Regular rate and rhythm; No murmurs, rubs, or gallops  ABDOMEN: Soft, nondistended, tenderness to palpation LLQ   EXTREMITIES:  2+ Peripheral Pulses, No clubbing, cyanosis, or edema  SKIN: No rashes or lesions    LABS:                                 11.0   8.1   )-----------( 210      ( 30 Mar 2018 07:01 )             34.8             03-30    138  |  108  |  19<H>  ----------------------------<  105<H>  3.7   |  20<L>  |  0.52    Ca    8.5      30 Mar 2018 07:01  Phos  2.2     03-30  Mg     2.2     03-30    TPro  7.1  /  Alb  3.0<L>  /  TBili  0.8  /  DBili  x   /  AST  11  /  ALT  12  /  AlkPhos  66  03-29

## 2018-03-30 NOTE — PROGRESS NOTE ADULT - PROBLEM SELECTOR PLAN 1
- continue zosyn (day 2)  - keep NPO due to persistent tenderness as per surgery  - serial abdominal exams- thus far no peritoneal signs  - will need repeat CT of the abdomen in 1 days  - blood cultures negative to date  - surgery follow-up  - ID : Dr. Elliott

## 2018-03-30 NOTE — PROGRESS NOTE ADULT - SUBJECTIVE AND OBJECTIVE BOX
INTERVAL HPI/OVERNIGHT EVENTS:  Pt stable.   NPO  No flatus/BM today    Vital Signs Last 24 Hrs  T(C): 36.6 (30 Mar 2018 05:02), Max: 36.6 (30 Mar 2018 05:02)  T(F): 97.9 (30 Mar 2018 05:02), Max: 97.9 (30 Mar 2018 05:02)  HR: 77 (30 Mar 2018 05:02) (77 - 79)  BP: 106/70 (30 Mar 2018 05:02) (99/61 - 106/70)  BP(mean): --  RR: 16 (30 Mar 2018 05:02) (16 - 16)  SpO2: 98% (30 Mar 2018 05:02) (97% - 98%)    Physical:  Abdomen: Soft nondistended, LLQ tenderness improved  No masses    I&O's Summary      LABS:                        11.0   8.1   )-----------( 210      ( 30 Mar 2018 07:01 )             34.8             03-30    138  |  108  |  19<H>  ----------------------------<  105<H>  3.7   |  20<L>  |  0.52    Ca    8.5      30 Mar 2018 07:01  Phos  2.2     03-30  Mg     2.2     03-30    TPro  7.1  /  Alb  3.0<L>  /  TBili  0.8  /  DBili  x   /  AST  11  /  ALT  12  /  AlkPhos  66  03-29

## 2018-03-30 NOTE — PROGRESS NOTE ADULT - PROBLEM SELECTOR PLAN 2
- continue d5 via IV as patient is NPO, accuchecks well controlled   - HbA1c is 9.8, will need lantus and tighter glycemic control when patient's diet is advanced   - follow accuchecks  - continue humalog sliding scale

## 2018-03-31 LAB
ANION GAP SERPL CALC-SCNC: 11 MMOL/L — SIGNIFICANT CHANGE UP (ref 5–17)
BASOPHILS # BLD AUTO: 0.1 K/UL — SIGNIFICANT CHANGE UP (ref 0–0.2)
BASOPHILS NFR BLD AUTO: 0.8 % — SIGNIFICANT CHANGE UP (ref 0–2)
BUN SERPL-MCNC: 18 MG/DL — SIGNIFICANT CHANGE UP (ref 7–18)
CALCIUM SERPL-MCNC: 8.5 MG/DL — SIGNIFICANT CHANGE UP (ref 8.4–10.5)
CHLORIDE SERPL-SCNC: 110 MMOL/L — HIGH (ref 96–108)
CO2 SERPL-SCNC: 19 MMOL/L — LOW (ref 22–31)
CREAT SERPL-MCNC: 0.48 MG/DL — LOW (ref 0.5–1.3)
EOSINOPHIL # BLD AUTO: 0.1 K/UL — SIGNIFICANT CHANGE UP (ref 0–0.5)
EOSINOPHIL NFR BLD AUTO: 0.8 % — SIGNIFICANT CHANGE UP (ref 0–6)
GLUCOSE SERPL-MCNC: 70 MG/DL — SIGNIFICANT CHANGE UP (ref 70–99)
GRAM STN FLD: SIGNIFICANT CHANGE UP
HCT VFR BLD CALC: 34.2 % — LOW (ref 34.5–45)
HGB BLD-MCNC: 11.1 G/DL — LOW (ref 11.5–15.5)
LYMPHOCYTES # BLD AUTO: 1.4 K/UL — SIGNIFICANT CHANGE UP (ref 1–3.3)
LYMPHOCYTES # BLD AUTO: 20.3 % — SIGNIFICANT CHANGE UP (ref 13–44)
MAGNESIUM SERPL-MCNC: 2 MG/DL — SIGNIFICANT CHANGE UP (ref 1.6–2.6)
MCHC RBC-ENTMCNC: 28.8 PG — SIGNIFICANT CHANGE UP (ref 27–34)
MCHC RBC-ENTMCNC: 32.5 GM/DL — SIGNIFICANT CHANGE UP (ref 32–36)
MCV RBC AUTO: 88.5 FL — SIGNIFICANT CHANGE UP (ref 80–100)
MONOCYTES # BLD AUTO: 0.6 K/UL — SIGNIFICANT CHANGE UP (ref 0–0.9)
MONOCYTES NFR BLD AUTO: 7.8 % — SIGNIFICANT CHANGE UP (ref 2–14)
NEUTROPHILS # BLD AUTO: 5 K/UL — SIGNIFICANT CHANGE UP (ref 1.8–7.4)
NEUTROPHILS NFR BLD AUTO: 70.2 % — SIGNIFICANT CHANGE UP (ref 43–77)
PHOSPHATE SERPL-MCNC: 2.4 MG/DL — LOW (ref 2.5–4.5)
PLATELET # BLD AUTO: 221 K/UL — SIGNIFICANT CHANGE UP (ref 150–400)
POTASSIUM SERPL-MCNC: 3.9 MMOL/L — SIGNIFICANT CHANGE UP (ref 3.5–5.3)
POTASSIUM SERPL-SCNC: 3.9 MMOL/L — SIGNIFICANT CHANGE UP (ref 3.5–5.3)
RBC # BLD: 3.87 M/UL — SIGNIFICANT CHANGE UP (ref 3.8–5.2)
RBC # FLD: 12.3 % — SIGNIFICANT CHANGE UP (ref 10.3–14.5)
SODIUM SERPL-SCNC: 140 MMOL/L — SIGNIFICANT CHANGE UP (ref 135–145)
WBC # BLD: 7.1 K/UL — SIGNIFICANT CHANGE UP (ref 3.8–10.5)
WBC # FLD AUTO: 7.1 K/UL — SIGNIFICANT CHANGE UP (ref 3.8–10.5)

## 2018-03-31 PROCEDURE — 99232 SBSQ HOSP IP/OBS MODERATE 35: CPT

## 2018-03-31 PROCEDURE — 74177 CT ABD & PELVIS W/CONTRAST: CPT | Mod: 26

## 2018-03-31 RX ORDER — SODIUM,POTASSIUM PHOSPHATES 278-250MG
1 POWDER IN PACKET (EA) ORAL
Refills: 0 | Status: COMPLETED | OUTPATIENT
Start: 2018-03-31 | End: 2018-04-03

## 2018-03-31 RX ORDER — GABAPENTIN 400 MG/1
100 CAPSULE ORAL
Refills: 0 | Status: DISCONTINUED | OUTPATIENT
Start: 2018-03-31 | End: 2018-04-06

## 2018-03-31 RX ADMIN — GABAPENTIN 100 MILLIGRAM(S): 400 CAPSULE ORAL at 23:31

## 2018-03-31 RX ADMIN — PIPERACILLIN AND TAZOBACTAM 25 GRAM(S): 4; .5 INJECTION, POWDER, LYOPHILIZED, FOR SOLUTION INTRAVENOUS at 01:27

## 2018-03-31 RX ADMIN — ENOXAPARIN SODIUM 40 MILLIGRAM(S): 100 INJECTION SUBCUTANEOUS at 11:36

## 2018-03-31 RX ADMIN — PIPERACILLIN AND TAZOBACTAM 25 GRAM(S): 4; .5 INJECTION, POWDER, LYOPHILIZED, FOR SOLUTION INTRAVENOUS at 10:35

## 2018-03-31 RX ADMIN — Medication 81 MILLIGRAM(S): at 11:36

## 2018-03-31 RX ADMIN — PIPERACILLIN AND TAZOBACTAM 25 GRAM(S): 4; .5 INJECTION, POWDER, LYOPHILIZED, FOR SOLUTION INTRAVENOUS at 17:40

## 2018-03-31 RX ADMIN — Medication 1 TABLET(S): at 21:19

## 2018-03-31 NOTE — PROGRESS NOTE ADULT - PROBLEM SELECTOR PLAN 2
- accuchecks well controlled   - HbA1c is 9.8, will need lantus and tighter glycemic control when patient's diet is advanced   - follow accuchecks  - continue humalog sliding scale

## 2018-03-31 NOTE — PROVIDER CONTACT NOTE (CRITICAL VALUE NOTIFICATION) - ACTION/TREATMENT ORDERED:
As per Dr. Trevino pt already on Zosyn, specimen likely contaminated, will order repeat blood cultures for the am.

## 2018-03-31 NOTE — CHART NOTE - NSCHARTNOTEFT_GEN_A_CORE
Reviewed CT scan results showing No signs of perforation. Will start on full liquid diet and progress as tolerated. Follow up by Primary Care Team. Alert and oriented to person, place and time

## 2018-03-31 NOTE — PROGRESS NOTE ADULT - SUBJECTIVE AND OBJECTIVE BOX
72y Female    Meds:  piperacillin/tazobactam IVPB. 3.375 Gram(s) IV Intermittent every 8 hours    Allergies    penicillin (Rash)    Intolerances        VITALS:  Vital Signs Last 24 Hrs  T(C): 36.9 (31 Mar 2018 14:03), Max: 37 (30 Mar 2018 21:12)  T(F): 98.5 (31 Mar 2018 14:03), Max: 98.6 (30 Mar 2018 21:12)  HR: 77 (31 Mar 2018 14:03) (75 - 78)  BP: 101/55 (31 Mar 2018 14:03) (95/56 - 104/62)  BP(mean): --  RR: 18 (31 Mar 2018 14:03) (18 - 18)  SpO2: 99% (31 Mar 2018 14:03) (97% - 99%)    LABS/DIAGNOSTIC TESTS:                          11.1   7.1   )-----------( 221      ( 31 Mar 2018 07:46 )             34.2         03-31    140  |  110<H>  |  18  ----------------------------<  70  3.9   |  19<L>  |  0.48<L>    Ca    8.5      31 Mar 2018 07:46  Phos  2.4     03-31  Mg     2.0     03-31            CULTURES: .Blood Blood-Peripheral  03-28 @ 10:12   Growth in anaerobic bottle: Gram Positive Rods  --    Growth in anaerobic bottle: Gram Positive Rods      .Urine Clean Catch (Midstream)  03-28 @ 10:06   <10,000 CFU/ml Normal Urogenital adolfo present  --  --            RADIOLOGY:      ROS:  [  ] UNABLE TO ELICIT 72y Female who is doing much better, she has very little abdominal  pain , she has no nausea or vomiting , her repeat CT abdomen shows mild diverticulitis still but no free air or signs of peritonitis. she was started on a full liquid diet today. Pt found to have bacteremia with gram positive rods in anaerobic bottle but is likely a contaminant.    Meds:  piperacillin/tazobactam IVPB. 3.375 Gram(s) IV Intermittent every 8 hours    Allergies    penicillin (Rash)    Intolerances        VITALS:  Vital Signs Last 24 Hrs  T(C): 36.9 (31 Mar 2018 14:03), Max: 37 (30 Mar 2018 21:12)  T(F): 98.5 (31 Mar 2018 14:03), Max: 98.6 (30 Mar 2018 21:12)  HR: 77 (31 Mar 2018 14:03) (75 - 78)  BP: 101/55 (31 Mar 2018 14:03) (95/56 - 104/62)  BP(mean): --  RR: 18 (31 Mar 2018 14:03) (18 - 18)  SpO2: 99% (31 Mar 2018 14:03) (97% - 99%)    LABS/DIAGNOSTIC TESTS:                          11.1   7.1   )-----------( 221      ( 31 Mar 2018 07:46 )             34.2         03-31    140  |  110<H>  |  18  ----------------------------<  70  3.9   |  19<L>  |  0.48<L>    Ca    8.5      31 Mar 2018 07:46  Phos  2.4     03-31  Mg     2.0     03-31            CULTURES: .Blood Blood-Peripheral  03-28 @ 10:12   Growth in anaerobic bottle: Gram Positive Rods  --    Growth in anaerobic bottle: Gram Positive Rods      .Urine Clean Catch (Midstream)  03-28 @ 10:06   <10,000 CFU/ml Normal Urogenital adolfo present  --  --            RADIOLOGY:      ROS:  [  ] UNABLE TO ELICIT

## 2018-03-31 NOTE — PROGRESS NOTE ADULT - PROBLEM SELECTOR PLAN 1
- continue zosyn (day 3)  - advance diet as tolerated  - serial abdominal exams- thus far no peritoneal signs  - repeat CT of the abdomen done  - blood cultures likely contaminant, f/u repeat  - surgery follow-up  - ID : Dr. Elliott

## 2018-03-31 NOTE — PROGRESS NOTE ADULT - SUBJECTIVE AND OBJECTIVE BOX
Patient is a 72y old  Female who presents with a chief complaint of chills (28 Mar 2018 11:05)      Patienr reports she feels better, tolerating liquid diet, denies any pain geo in abd, any N/V      MEDICATIONS  (STANDING):  aspirin  chewable 81 milliGRAM(s) Oral daily  dextrose 5%. 1000 milliLiter(s) (50 mL/Hr) IV Continuous <Continuous>  dextrose 50% Injectable 12.5 Gram(s) IV Push once  dextrose 50% Injectable 25 Gram(s) IV Push once  dextrose 50% Injectable 25 Gram(s) IV Push once  enoxaparin Injectable 40 milliGRAM(s) SubCutaneous daily  insulin lispro (HumaLOG) corrective regimen sliding scale   SubCutaneous three times a day before meals  piperacillin/tazobactam IVPB. 3.375 Gram(s) IV Intermittent every 8 hours  potassium acid phosphate/sodium acid phosphate tablet (K-PHOS No. 2) 1 Tablet(s) Oral four times a day with meals  sodium chloride 0.9%. 1000 milliLiter(s) (75 mL/Hr) IV Continuous <Continuous>    MEDICATIONS  (PRN):  dextrose Gel 1 Dose(s) Oral once PRN Blood Glucose LESS THAN 70 milliGRAM(s)/deciliter  glucagon  Injectable 1 milliGRAM(s) IntraMuscular once PRN Glucose LESS THAN 70 milligrams/deciliter        REVIEW OF SYSTEMS:  CONSTITUTIONAL: No fever, weight loss, or fatigue  EYES: No eye pain, visual disturbances, or discharge  ENMT:  No difficulty hearing, tinnitus, vertigo; No sinus or throat pain  NECK: No pain or stiffness  RESPIRATORY: No cough, wheezing, chills or hemoptysis; No shortness of breath  CARDIOVASCULAR: No chest pain, palpitations, dizziness, or leg swelling  GASTROINTESTINAL: No abdominal or epigastric pain. No nausea, vomiting, or hematemesis; No diarrhea or constipation. No melena or hematochezia.  GENITOURINARY: No dysuria, frequency, hematuria, or incontinence  NEUROLOGICAL: No headaches, memory loss, loss of strength, numbness, or tremors  SKIN: No itching, burning, rashes, or lesions   LYMPH NODES: No enlarged glands  ENDOCRINE: No heat or cold intolerance; No hair loss  MUSCULOSKELETAL: No joint pain or swelling; No muscle, back, or extremity pain  PSYCHIATRIC: No depression, anxiety, mood swings, or difficulty sleeping  HEME/LYMPH: No easy bruising, or bleeding gums  ALLERY AND IMMUNOLOGIC: No hives or eczema           PHYSICAL EXAM:    T(C): 36.9 (03-31-18 @ 14:03), Max: 37 (03-30-18 @ 21:12)  HR: 77 (03-31-18 @ 14:03) (75 - 78)  BP: 101/55 (03-31-18 @ 14:03) (95/56 - 104/62)  RR: 18 (03-31-18 @ 14:03) (18 - 18)  SpO2: 99% (03-31-18 @ 14:03) (97% - 99%)      GENERAL: Elderly thin female, NAD  HEAD:  Atraumatic, Normocephalic  EYES: EOMI, PERRL, conjunctiva and sclera clear  ENMT: No tonsillar erythema, exudates, or enlargement; Moist mucous membranes, Good dentition, No lesions  NECK: Supple, No JVD, Normal thyroid  NERVOUS SYSTEM:  Alert & Oriented X3, Good concentration;   CHEST/LUNG: Clear to percussion bilaterally; No rales, rhonchi, wheezing, or rubs  HEART: Regular rate and rhythm; No murmurs, rubs, or gallops  ABDOMEN: Soft, Nontender, Nondistended; Bowel sounds present  EXTREMITIES:  2+ Peripheral Pulses, No clubbing, cyanosis, or edema  LYMPH: No lymphadenopathy noted  SKIN: No rashes or lesions    LABS:                        11.1   7.1   )-----------( 221      ( 31 Mar 2018 07:46 )             34.2     03-31    140  |  110<H>  |  18  ----------------------------<  70  3.9   |  19<L>  |  0.48<L>    Ca    8.5      31 Mar 2018 07:46  Phos  2.4     03-31  Mg     2.0     03-31          CAPILLARY BLOOD GLUCOSE  POCT Blood Glucose.: 166 mg/dL (31 Mar 2018 16:35)  POCT Blood Glucose.: 106 mg/dL (31 Mar 2018 11:15)  POCT Blood Glucose.: 83 mg/dL (31 Mar 2018 07:28)  POCT Blood Glucose.: 81 mg/dL (30 Mar 2018 22:02)      RADIOLOGY & ADDITIONAL TESTS:    Imaging Personally Reviewed:  [x] YES  [ ] NO    Consultant(s) Notes Reviewed:  [x] YES  [ ] NO    Care Discussed with Consultants/Other Providers [x] YES  [ ] NO

## 2018-03-31 NOTE — PROGRESS NOTE ADULT - SUBJECTIVE AND OBJECTIVE BOX
Pt c/o mild LLQ pain  Denies fever/chills    Tolerating CLD    PE: comfortable    Vital Signs Last 24 Hrs  T(C): 36.7 (31 Mar 2018 05:02), Max: 37 (30 Mar 2018 15:01)  T(F): 98 (31 Mar 2018 05:02), Max: 98.6 (30 Mar 2018 15:01)  HR: 75 (31 Mar 2018 05:02) (75 - 80)  BP: 104/62 (31 Mar 2018 06:30) (95/56 - 104/62)  BP(mean): --  RR: 18 (31 Mar 2018 05:02) (16 - 18)  SpO2: 97% (31 Mar 2018 05:02) (97% - 99%)    Abd: soft, ND, mild LLQ tenderness on palpation no guarding or rebound                            11.1   7.1   )-----------( 221      ( 31 Mar 2018 07:46 )             34.2     03-31    140  |  110<H>  |  18  ----------------------------<  70  3.9   |  19<L>  |  0.48<L>    Ca    8.5      31 Mar 2018 07:46  Phos  2.4     03-31  Mg     2.0     03-31

## 2018-04-01 DIAGNOSIS — E83.39 OTHER DISORDERS OF PHOSPHORUS METABOLISM: ICD-10-CM

## 2018-04-01 LAB
ANION GAP SERPL CALC-SCNC: 9 MMOL/L — SIGNIFICANT CHANGE UP (ref 5–17)
BASOPHILS # BLD AUTO: 0 K/UL — SIGNIFICANT CHANGE UP (ref 0–0.2)
BASOPHILS NFR BLD AUTO: 1 % — SIGNIFICANT CHANGE UP (ref 0–2)
BUN SERPL-MCNC: 13 MG/DL — SIGNIFICANT CHANGE UP (ref 7–18)
CALCIUM SERPL-MCNC: 9.1 MG/DL — SIGNIFICANT CHANGE UP (ref 8.4–10.5)
CHLORIDE SERPL-SCNC: 107 MMOL/L — SIGNIFICANT CHANGE UP (ref 96–108)
CO2 SERPL-SCNC: 24 MMOL/L — SIGNIFICANT CHANGE UP (ref 22–31)
CREAT SERPL-MCNC: 0.46 MG/DL — LOW (ref 0.5–1.3)
EOSINOPHIL # BLD AUTO: 0 K/UL — SIGNIFICANT CHANGE UP (ref 0–0.5)
EOSINOPHIL NFR BLD AUTO: 1.1 % — SIGNIFICANT CHANGE UP (ref 0–6)
GLUCOSE SERPL-MCNC: 144 MG/DL — HIGH (ref 70–99)
HCT VFR BLD CALC: 32.7 % — LOW (ref 34.5–45)
HGB BLD-MCNC: 11.3 G/DL — LOW (ref 11.5–15.5)
LYMPHOCYTES # BLD AUTO: 1.1 K/UL — SIGNIFICANT CHANGE UP (ref 1–3.3)
LYMPHOCYTES # BLD AUTO: 23.7 % — SIGNIFICANT CHANGE UP (ref 13–44)
MAGNESIUM SERPL-MCNC: 1.9 MG/DL — SIGNIFICANT CHANGE UP (ref 1.6–2.6)
MCHC RBC-ENTMCNC: 29.6 PG — SIGNIFICANT CHANGE UP (ref 27–34)
MCHC RBC-ENTMCNC: 34.7 GM/DL — SIGNIFICANT CHANGE UP (ref 32–36)
MCV RBC AUTO: 85.5 FL — SIGNIFICANT CHANGE UP (ref 80–100)
MONOCYTES # BLD AUTO: 0.5 K/UL — SIGNIFICANT CHANGE UP (ref 0–0.9)
MONOCYTES NFR BLD AUTO: 10.5 % — SIGNIFICANT CHANGE UP (ref 2–14)
NEUTROPHILS # BLD AUTO: 2.9 K/UL — SIGNIFICANT CHANGE UP (ref 1.8–7.4)
NEUTROPHILS NFR BLD AUTO: 63.8 % — SIGNIFICANT CHANGE UP (ref 43–77)
PHOSPHATE SERPL-MCNC: 3.5 MG/DL — SIGNIFICANT CHANGE UP (ref 2.5–4.5)
PLATELET # BLD AUTO: 242 K/UL — SIGNIFICANT CHANGE UP (ref 150–400)
POTASSIUM SERPL-MCNC: 3.8 MMOL/L — SIGNIFICANT CHANGE UP (ref 3.5–5.3)
POTASSIUM SERPL-SCNC: 3.8 MMOL/L — SIGNIFICANT CHANGE UP (ref 3.5–5.3)
RBC # BLD: 3.82 M/UL — SIGNIFICANT CHANGE UP (ref 3.8–5.2)
RBC # FLD: 12.2 % — SIGNIFICANT CHANGE UP (ref 10.3–14.5)
SODIUM SERPL-SCNC: 140 MMOL/L — SIGNIFICANT CHANGE UP (ref 135–145)
WBC # BLD: 4.6 K/UL — SIGNIFICANT CHANGE UP (ref 3.8–10.5)
WBC # FLD AUTO: 4.6 K/UL — SIGNIFICANT CHANGE UP (ref 3.8–10.5)

## 2018-04-01 PROCEDURE — 99232 SBSQ HOSP IP/OBS MODERATE 35: CPT

## 2018-04-01 RX ORDER — ACETAMINOPHEN 500 MG
650 TABLET ORAL ONCE
Refills: 0 | Status: COMPLETED | OUTPATIENT
Start: 2018-04-01 | End: 2018-04-01

## 2018-04-01 RX ADMIN — PIPERACILLIN AND TAZOBACTAM 25 GRAM(S): 4; .5 INJECTION, POWDER, LYOPHILIZED, FOR SOLUTION INTRAVENOUS at 00:20

## 2018-04-01 RX ADMIN — Medication 1 TABLET(S): at 11:35

## 2018-04-01 RX ADMIN — PIPERACILLIN AND TAZOBACTAM 25 GRAM(S): 4; .5 INJECTION, POWDER, LYOPHILIZED, FOR SOLUTION INTRAVENOUS at 17:49

## 2018-04-01 RX ADMIN — Medication 1 TABLET(S): at 23:12

## 2018-04-01 RX ADMIN — GABAPENTIN 100 MILLIGRAM(S): 400 CAPSULE ORAL at 17:49

## 2018-04-01 RX ADMIN — Medication 1 TABLET(S): at 08:05

## 2018-04-01 RX ADMIN — Medication 81 MILLIGRAM(S): at 11:35

## 2018-04-01 RX ADMIN — PIPERACILLIN AND TAZOBACTAM 25 GRAM(S): 4; .5 INJECTION, POWDER, LYOPHILIZED, FOR SOLUTION INTRAVENOUS at 11:42

## 2018-04-01 RX ADMIN — Medication 1 TABLET(S): at 16:48

## 2018-04-01 RX ADMIN — ENOXAPARIN SODIUM 40 MILLIGRAM(S): 100 INJECTION SUBCUTANEOUS at 11:35

## 2018-04-01 RX ADMIN — Medication 650 MILLIGRAM(S): at 23:50

## 2018-04-01 RX ADMIN — Medication 1: at 12:11

## 2018-04-01 RX ADMIN — GABAPENTIN 100 MILLIGRAM(S): 400 CAPSULE ORAL at 06:15

## 2018-04-01 RX ADMIN — Medication 1: at 08:05

## 2018-04-01 RX ADMIN — Medication 1: at 16:48

## 2018-04-01 NOTE — PROGRESS NOTE ADULT - PROBLEM SELECTOR PLAN 5
-Need for prophylactic measure.  Plan: [] Previous VTE                                                3  [] Thrombophilia                                             2  [] Lower limb paralysis                                   2    [] Current Cancer                                             2   [X] Immobilization > 24 hrs                              1  [] ICU/CCU stay > 24 hours                             1  [X] Age > 60                                                         1    IMPROVE VTE Score: 2  lovenox.

## 2018-04-01 NOTE — PROGRESS NOTE ADULT - SUBJECTIVE AND OBJECTIVE BOX
pt with sigmoid diverticulitis  ICU Vital Signs Last 24 Hrs  T(C): 36.7 (01 Apr 2018 14:17), Max: 37.2 (31 Mar 2018 21:02)  T(F): 98 (01 Apr 2018 14:17), Max: 99 (31 Mar 2018 21:02)  HR: 78 (01 Apr 2018 14:17) (67 - 78)  BP: 104/68 (01 Apr 2018 14:17) (104/68 - 121/57)  BP(mean): --  ABP: --  ABP(mean): --  RR: 17 (01 Apr 2018 14:17) (17 - 18)  SpO2: 95% (01 Apr 2018 14:17) (95% - 100%)                        11.3   4.6   )-----------( 242      ( 01 Apr 2018 08:05 )             32.7   04-01    140  |  107  |  13  ----------------------------<  144<H>  3.8   |  24  |  0.46<L>    Ca    9.1      01 Apr 2018 08:05  Phos  3.5     04-01  Mg     1.9     04-01    Abdomen mildly tender in LLQ  On IV abx  Had a small bm

## 2018-04-01 NOTE — PROGRESS NOTE ADULT - SUBJECTIVE AND OBJECTIVE BOX
Patient is a 72y old  Female who presents with a chief complaint of chills (28 Mar 2018 11:05)    Patient reports she feels well, tolerating diet, denies any abdominal pain, N/V, fever/ chills, states had bowel movement.    MEDICATIONS  (STANDING):  aspirin  chewable 81 milliGRAM(s) Oral daily  dextrose 5%. 1000 milliLiter(s) (50 mL/Hr) IV Continuous <Continuous>  dextrose 50% Injectable 12.5 Gram(s) IV Push once  dextrose 50% Injectable 25 Gram(s) IV Push once  dextrose 50% Injectable 25 Gram(s) IV Push once  enoxaparin Injectable 40 milliGRAM(s) SubCutaneous daily  gabapentin 100 milliGRAM(s) Oral two times a day  insulin lispro (HumaLOG) corrective regimen sliding scale   SubCutaneous three times a day before meals  piperacillin/tazobactam IVPB. 3.375 Gram(s) IV Intermittent every 8 hours  potassium acid phosphate/sodium acid phosphate tablet (K-PHOS No. 2) 1 Tablet(s) Oral four times a day with meals  sodium chloride 0.9%. 1000 milliLiter(s) (75 mL/Hr) IV Continuous <Continuous>    MEDICATIONS  (PRN):  dextrose Gel 1 Dose(s) Oral once PRN Blood Glucose LESS THAN 70 milliGRAM(s)/deciliter  glucagon  Injectable 1 milliGRAM(s) IntraMuscular once PRN Glucose LESS THAN 70 milligrams/deciliter        REVIEW OF SYSTEMS:  CONSTITUTIONAL: No fever, weight loss, or fatigue  EYES: No eye pain, visual disturbances, or discharge  ENMT:  No difficulty hearing, tinnitus, vertigo; No sinus or throat pain  NECK: No pain or stiffness  RESPIRATORY: No cough, wheezing, chills or hemoptysis; No shortness of breath  CARDIOVASCULAR: No chest pain, palpitations, dizziness, or leg swelling  GASTROINTESTINAL: No abdominal or epigastric pain. No nausea, vomiting, or hematemesis; No diarrhea or constipation. No melena or hematochezia.  GENITOURINARY: No dysuria, frequency, hematuria, or incontinence  NEUROLOGICAL: No headaches, memory loss, loss of strength, numbness, or tremors  SKIN: No itching, burning, rashes, or lesions   LYMPH NODES: No enlarged glands  ENDOCRINE: No heat or cold intolerance; No hair loss  MUSCULOSKELETAL: No joint pain or swelling; No muscle, back, or extremity pain  PSYCHIATRIC: No depression, anxiety, mood swings, or difficulty sleeping  HEME/LYMPH: No easy bruising, or bleeding gums  ALLERY AND IMMUNOLOGIC: No hives or eczema           PHYSICAL EXAM:    T(C): 36.7 (04-01-18 @ 14:17), Max: 37.2 (03-31-18 @ 21:02)  HR: 78 (04-01-18 @ 14:17) (67 - 78)  BP: 104/68 (04-01-18 @ 14:17) (104/68 - 121/57)  RR: 17 (04-01-18 @ 14:17) (17 - 18)  SpO2: 95% (04-01-18 @ 14:17) (95% - 100%)  I&O's Summary    31 Mar 2018 07:01  -  01 Apr 2018 07:00  --------------------------------------------------------  IN: 0 mL / OUT: 1 mL / NET: -1 mL        GENERAL: Elderly female, NAD, well-groomed, well-developed  HEAD:  Atraumatic, Normocephalic  EYES: EOMI, PERRL, conjunctiva and sclera clear  ENMT: No tonsillar erythema, exudates, or enlargement; Moist mucous membranes  NECK: Supple, No JVD, Normal thyroid  NERVOUS SYSTEM:  Alert & Oriented X3, Good concentration;   CHEST/LUNG: Clear to percussion bilaterally; No rales, rhonchi, wheezing, or rubs  HEART: Regular rate and rhythm; No murmurs, rubs, or gallops  ABDOMEN: Soft, Nontender, Nondistended; Bowel sounds present  EXTREMITIES:  2+ Peripheral Pulses, No clubbing, cyanosis, or edema  LYMPH: No lymphadenopathy noted  SKIN: No rashes or lesions    LABS:                        11.3   4.6   )-----------( 242      ( 01 Apr 2018 08:05 )             32.7     04-01    140  |  107  |  13  ----------------------------<  144<H>  3.8   |  24  |  0.46<L>    Ca    9.1      01 Apr 2018 08:05  Phos  3.5     04-01  Mg     1.9     04-01          CAPILLARY BLOOD GLUCOSE  POCT Blood Glucose.: 163 mg/dL (01 Apr 2018 16:33)  POCT Blood Glucose.: 181 mg/dL (01 Apr 2018 11:54)  POCT Blood Glucose.: 157 mg/dL (01 Apr 2018 08:02)  POCT Blood Glucose.: 152 mg/dL (31 Mar 2018 21:39)        RADIOLOGY & ADDITIONAL TESTS:    Imaging Personally Reviewed:  [x] YES  [ ] NO    Consultant(s) Notes Reviewed:  [x] YES  [ ] NO    Care Discussed with Consultants/Other Providers [x] YES  [ ] NO

## 2018-04-01 NOTE — PROGRESS NOTE ADULT - PROBLEM SELECTOR PLAN 1
- continue zosyn (day 4)  - advance diet as tolerated  - serial abdominal exams- thus far no peritoneal signs  - repeat CT of the abdomen done - perforation resolved  - blood cultures likely contaminant, f/u repeat  - surgery follow-up  - ID : Dr. Elliott

## 2018-04-01 NOTE — PROGRESS NOTE ADULT - PROBLEM SELECTOR PLAN 2
- accuchecks well controlled   - HbA1c is 9.8, will need lantus and tighter glycemic control when patient's diet is advanced tomorrow  - follow accuchecks  - continue humalog sliding scale

## 2018-04-02 LAB
ALBUMIN SERPL ELPH-MCNC: 3.1 G/DL — LOW (ref 3.5–5)
ALP SERPL-CCNC: 61 U/L — SIGNIFICANT CHANGE UP (ref 40–120)
ALT FLD-CCNC: 11 U/L DA — SIGNIFICANT CHANGE UP (ref 10–60)
ANION GAP SERPL CALC-SCNC: 7 MMOL/L — SIGNIFICANT CHANGE UP (ref 5–17)
AST SERPL-CCNC: 12 U/L — SIGNIFICANT CHANGE UP (ref 10–40)
BASOPHILS # BLD AUTO: 0.1 K/UL — SIGNIFICANT CHANGE UP (ref 0–0.2)
BASOPHILS NFR BLD AUTO: 1.2 % — SIGNIFICANT CHANGE UP (ref 0–2)
BILIRUB SERPL-MCNC: 0.4 MG/DL — SIGNIFICANT CHANGE UP (ref 0.2–1.2)
BUN SERPL-MCNC: 11 MG/DL — SIGNIFICANT CHANGE UP (ref 7–18)
CALCIUM SERPL-MCNC: 9.1 MG/DL — SIGNIFICANT CHANGE UP (ref 8.4–10.5)
CHLORIDE SERPL-SCNC: 107 MMOL/L — SIGNIFICANT CHANGE UP (ref 96–108)
CO2 SERPL-SCNC: 28 MMOL/L — SIGNIFICANT CHANGE UP (ref 22–31)
CREAT SERPL-MCNC: 0.65 MG/DL — SIGNIFICANT CHANGE UP (ref 0.5–1.3)
CULTURE RESULTS: SIGNIFICANT CHANGE UP
CULTURE RESULTS: SIGNIFICANT CHANGE UP
EOSINOPHIL # BLD AUTO: 0 K/UL — SIGNIFICANT CHANGE UP (ref 0–0.5)
EOSINOPHIL NFR BLD AUTO: 1 % — SIGNIFICANT CHANGE UP (ref 0–6)
GLUCOSE SERPL-MCNC: 156 MG/DL — HIGH (ref 70–99)
HCT VFR BLD CALC: 35 % — SIGNIFICANT CHANGE UP (ref 34.5–45)
HGB BLD-MCNC: 11.2 G/DL — LOW (ref 11.5–15.5)
LYMPHOCYTES # BLD AUTO: 1.8 K/UL — SIGNIFICANT CHANGE UP (ref 1–3.3)
LYMPHOCYTES # BLD AUTO: 38.2 % — SIGNIFICANT CHANGE UP (ref 13–44)
MAGNESIUM SERPL-MCNC: 2 MG/DL — SIGNIFICANT CHANGE UP (ref 1.6–2.6)
MCHC RBC-ENTMCNC: 27.9 PG — SIGNIFICANT CHANGE UP (ref 27–34)
MCHC RBC-ENTMCNC: 32 GM/DL — SIGNIFICANT CHANGE UP (ref 32–36)
MCV RBC AUTO: 87.2 FL — SIGNIFICANT CHANGE UP (ref 80–100)
MONOCYTES # BLD AUTO: 0.5 K/UL — SIGNIFICANT CHANGE UP (ref 0–0.9)
MONOCYTES NFR BLD AUTO: 9.8 % — SIGNIFICANT CHANGE UP (ref 2–14)
NEUTROPHILS # BLD AUTO: 2.3 K/UL — SIGNIFICANT CHANGE UP (ref 1.8–7.4)
NEUTROPHILS NFR BLD AUTO: 49.8 % — SIGNIFICANT CHANGE UP (ref 43–77)
PHOSPHATE SERPL-MCNC: 3.6 MG/DL — SIGNIFICANT CHANGE UP (ref 2.5–4.5)
PLATELET # BLD AUTO: 266 K/UL — SIGNIFICANT CHANGE UP (ref 150–400)
POTASSIUM SERPL-MCNC: 3.9 MMOL/L — SIGNIFICANT CHANGE UP (ref 3.5–5.3)
POTASSIUM SERPL-SCNC: 3.9 MMOL/L — SIGNIFICANT CHANGE UP (ref 3.5–5.3)
PROT SERPL-MCNC: 7.3 G/DL — SIGNIFICANT CHANGE UP (ref 6–8.3)
RBC # BLD: 4.02 M/UL — SIGNIFICANT CHANGE UP (ref 3.8–5.2)
RBC # FLD: 12.4 % — SIGNIFICANT CHANGE UP (ref 10.3–14.5)
SODIUM SERPL-SCNC: 142 MMOL/L — SIGNIFICANT CHANGE UP (ref 135–145)
SPECIMEN SOURCE: SIGNIFICANT CHANGE UP
SPECIMEN SOURCE: SIGNIFICANT CHANGE UP
WBC # BLD: 4.7 K/UL — SIGNIFICANT CHANGE UP (ref 3.8–10.5)
WBC # FLD AUTO: 4.7 K/UL — SIGNIFICANT CHANGE UP (ref 3.8–10.5)

## 2018-04-02 PROCEDURE — 74177 CT ABD & PELVIS W/CONTRAST: CPT | Mod: 26

## 2018-04-02 RX ORDER — SIMVASTATIN 20 MG/1
10 TABLET, FILM COATED ORAL AT BEDTIME
Refills: 0 | Status: DISCONTINUED | OUTPATIENT
Start: 2018-04-02 | End: 2018-04-06

## 2018-04-02 RX ADMIN — PIPERACILLIN AND TAZOBACTAM 25 GRAM(S): 4; .5 INJECTION, POWDER, LYOPHILIZED, FOR SOLUTION INTRAVENOUS at 08:19

## 2018-04-02 RX ADMIN — SIMVASTATIN 10 MILLIGRAM(S): 20 TABLET, FILM COATED ORAL at 21:22

## 2018-04-02 RX ADMIN — PIPERACILLIN AND TAZOBACTAM 25 GRAM(S): 4; .5 INJECTION, POWDER, LYOPHILIZED, FOR SOLUTION INTRAVENOUS at 17:08

## 2018-04-02 RX ADMIN — Medication 1 TABLET(S): at 11:45

## 2018-04-02 RX ADMIN — Medication 1: at 17:07

## 2018-04-02 RX ADMIN — ENOXAPARIN SODIUM 40 MILLIGRAM(S): 100 INJECTION SUBCUTANEOUS at 11:45

## 2018-04-02 RX ADMIN — PIPERACILLIN AND TAZOBACTAM 25 GRAM(S): 4; .5 INJECTION, POWDER, LYOPHILIZED, FOR SOLUTION INTRAVENOUS at 00:45

## 2018-04-02 RX ADMIN — GABAPENTIN 100 MILLIGRAM(S): 400 CAPSULE ORAL at 05:15

## 2018-04-02 RX ADMIN — GABAPENTIN 100 MILLIGRAM(S): 400 CAPSULE ORAL at 17:07

## 2018-04-02 RX ADMIN — Medication 650 MILLIGRAM(S): at 01:07

## 2018-04-02 RX ADMIN — Medication 1: at 11:45

## 2018-04-02 RX ADMIN — Medication 1: at 08:18

## 2018-04-02 RX ADMIN — Medication 1 TABLET(S): at 17:07

## 2018-04-02 RX ADMIN — Medication 1 TABLET(S): at 08:19

## 2018-04-02 RX ADMIN — Medication 1 TABLET(S): at 21:22

## 2018-04-02 RX ADMIN — Medication 81 MILLIGRAM(S): at 11:45

## 2018-04-02 NOTE — PROGRESS NOTE ADULT - SUBJECTIVE AND OBJECTIVE BOX
73 y/o female is under our care for acute perforated diverticulitis.  Patient is still c/o lingering LLQ pain. States it only exacerbates with movement or increased intra-abd pressure. Patient was placed back on clear liquid diet.  Awaiting to have repeat CT A/P. Admits she was able to have soft, pudding-like stool this am.    REVIEW OF SYSTEMS:  [  ] Not able to illicit  General: no fevers no malaise  Chest: no cough no sob  GI: no nvd +abd pain  Skin: no rashes  Neuro: no ha's no dizziness     ALLERGIES: penicillin (Rash)    MEDS:  piperacillin/tazobactam IVPB. 3.375 Gram(s) IV Intermittent every 8 hours    VITALS:  Vital Signs Last 24 Hrs  T(C): 36.6 (02 Apr 2018 14:06), Max: 36.7 (01 Apr 2018 21:18)  T(F): 97.8 (02 Apr 2018 14:06), Max: 98 (01 Apr 2018 21:18)  HR: 71 (02 Apr 2018 14:06) (59 - 71)  BP: 109/68 (02 Apr 2018 14:06) (109/68 - 116/63)  BP(mean): --  RR: 16 (02 Apr 2018 14:06) (16 - 18)  SpO2: 97% (02 Apr 2018 14:06) (96% - 97%)      PHYSICAL EXAM:  HEENT: n/a  Neck: supple no LN's   Respiratory: lungs clear no rales no rhonchi  Cardiovascular: S1 S2 reg no murmurs  Gastrointestinal: +BS with soft, nondistended abdomen; faint LLQ tenderness with palpation  Extremities: no edema   Skin: no rashes  Ortho: n/a  Neuro: AAO x 3      LABS/DIAGNOSTIC TESTS:                        11.2   4.7   )-----------( 266      ( 02 Apr 2018 06:35 )             35.0   04-02    142  |  107  |  11  ----------------------------<  156<H>  3.9   |  28  |  0.65    Ca    9.1      02 Apr 2018 06:35  Phos  3.6     04-02  Mg     2.0     04-02    TPro  7.3  /  Alb  3.1<L>  /  TBili  0.4  /  DBili  x   /  AST  12  /  ALT  11  /  AlkPhos  61  04-02      CULTURES:   .Blood Blood-Peripheral  03-31 @ 23:02   No growth to date.  --  --      .Blood Blood-Peripheral  03-28 @ 10:12   Growth in anaerobic bottle: Eubacterium limosum  --  LIKELY A CONTAMINANT  Growth in anaerobic bottle: Gram Positive Rods      .Urine Clean Catch (Midstream)  03-28 @ 10:06   <10,000 CFU/ml Normal Urogenital adolfo present  --  --      RADIOLOGY:  4/02 CT A/P - ordered

## 2018-04-02 NOTE — PROGRESS NOTE ADULT - SUBJECTIVE AND OBJECTIVE BOX
INTERVAL HPI/OVERNIGHT EVENTS:  Pt stable.   NPO  flatus/BM.  Still C/O LLQ pain when active    Vital Signs Last 24 Hrs  T(C): 36.4 (02 Apr 2018 05:57), Max: 36.7 (01 Apr 2018 14:17)  T(F): 97.6 (02 Apr 2018 05:57), Max: 98 (01 Apr 2018 14:17)  HR: 59 (02 Apr 2018 05:57) (59 - 78)  BP: 112/66 (02 Apr 2018 05:57) (104/68 - 116/63)  BP(mean): --  RR: 18 (02 Apr 2018 05:57) (16 - 18)  SpO2: 97% (02 Apr 2018 05:57) (95% - 97%)    Physical:  Abdomen: Soft nondistended, mild LLQ tenderness, improved  No masses, no guarding, no rebound    I&O's Summary      LABS:                        11.2   4.7   )-----------( 266      ( 02 Apr 2018 06:35 )             35.0             04-02    142  |  107  |  11  ----------------------------<  156<H>  3.9   |  28  |  0.65    Ca    9.1      02 Apr 2018 06:35  Phos  3.6     04-02  Mg     2.0     04-02    TPro  7.3  /  Alb  3.1<L>  /  TBili  0.4  /  DBili  x   /  AST  12  /  ALT  11  /  AlkPhos  61  04-02

## 2018-04-02 NOTE — PROGRESS NOTE ADULT - SUBJECTIVE AND OBJECTIVE BOX
Patient examined at bedside, still complaining of LLQ pain that has not improved  No nausea, no vomiting  Tolerating clears  No BM in a few days, denies flatus  Denies fevers and chills  White count normal   On antibiotics        T(F): 97.6 (04-02-18 @ 05:57), Max: 98 (04-01-18 @ 14:17)  HR: 59 (04-02-18 @ 05:57) (59 - 78)  BP: 112/66 (04-02-18 @ 05:57) (104/68 - 116/63)  RR: 18 (04-02-18 @ 05:57) (16 - 18)  SpO2: 97% (04-02-18 @ 05:57) (95% - 97%)  Wt(kg): --          Physical Exam  General: AAOx3, No acute distress  Skin: No jaundice, no icterus  Abdomen: soft, LLQ tenderness, localized peritonitis, nondistended, localized LLQ rebound tenderness, no guarding, no palpable masses  Extremities: non edematous, no calf pain bilaterally

## 2018-04-02 NOTE — PROGRESS NOTE ADULT - SUBJECTIVE AND OBJECTIVE BOX
Patient is a 72 year-old female with PMH of DM, HLD who presented with chills and is admitted for sigmoid diverticulitis with localized perforation.      INTERVAL HPI/OVERNIGHT EVENTS: No events overnight. Patient seen and examined at bedside. Reports improvement in abdominal pain.  Has burning/tingling in hands and feet, that have been longstanding.     MEDICATIONS  (STANDING):  aspirin  chewable 81 milliGRAM(s) Oral daily  dextrose 5%. 1000 milliLiter(s) (50 mL/Hr) IV Continuous <Continuous>  dextrose 50% Injectable 12.5 Gram(s) IV Push once  dextrose 50% Injectable 25 Gram(s) IV Push once  dextrose 50% Injectable 25 Gram(s) IV Push once  enoxaparin Injectable 40 milliGRAM(s) SubCutaneous daily  gabapentin 100 milliGRAM(s) Oral two times a day  insulin lispro (HumaLOG) corrective regimen sliding scale   SubCutaneous three times a day before meals  piperacillin/tazobactam IVPB. 3.375 Gram(s) IV Intermittent every 8 hours  potassium acid phosphate/sodium acid phosphate tablet (K-PHOS No. 2) 1 Tablet(s) Oral four times a day with meals  sodium chloride 0.9%. 1000 milliLiter(s) (75 mL/Hr) IV Continuous <Continuous>    MEDICATIONS  (PRN):  dextrose Gel 1 Dose(s) Oral once PRN Blood Glucose LESS THAN 70 milliGRAM(s)/deciliter  glucagon  Injectable 1 milliGRAM(s) IntraMuscular once PRN Glucose LESS THAN 70 milligrams/deciliter      Vital Signs Last 24 Hrs  T(C): 36.4 (02 Apr 2018 05:57), Max: 36.7 (01 Apr 2018 14:17)  T(F): 97.6 (02 Apr 2018 05:57), Max: 98 (01 Apr 2018 14:17)  HR: 59 (02 Apr 2018 05:57) (59 - 78)  BP: 112/66 (02 Apr 2018 05:57) (104/68 - 116/63)  BP(mean): --  RR: 18 (02 Apr 2018 05:57) (16 - 18)  SpO2: 97% (02 Apr 2018 05:57) (95% - 97%)        REVIEW OF SYSTEMS: denies fever, chills, SOB, palpitations, chest pain, dizziness  All other ROS are negative    PHYSICAL EXAM:  GENERAL: no distress, well developed, well-groomed   HEAD:  Atraumatic, Normocephalic  EYES: PERRL  ENMT: moist mucus membranes  NECK: Supple  NERVOUS SYSTEM:  Alert & Oriented X3, no focal deficits   CHEST/LUNG: clear to ausculation bilaterally   HEART: Regular rate and rhythm; No murmurs, rubs, or gallops  ABDOMEN: Soft, nondistended, nontender   EXTREMITIES: No clubbing, cyanosis, or edema  SKIN: No rashes or lesions    LABS:                                                  11.2   4.7   )-----------( 266      ( 02 Apr 2018 06:35 )             35.0             04-02    142  |  107  |  11  ----------------------------<  156<H>  3.9   |  28  |  0.65    Ca    9.1      02 Apr 2018 06:35  Phos  3.6     04-02  Mg     2.0     04-02    TPro  7.3  /  Alb  3.1<L>  /  TBili  0.4  /  DBili  x   /  AST  12  /  ALT  11  /  AlkPhos  61  04-02 Patient is a 72 year-old female with PMH of DM, HLD who presented with chills and is admitted for sigmoid diverticulitis with localized perforation.      INTERVAL HPI/OVERNIGHT EVENTS: No events overnight. Patient seen and examined at bedside. Reports improvement in abdominal pain.  Has burning/tingling in hands and feet, that have been longstanding.     MEDICATIONS  (STANDING):  aspirin  chewable 81 milliGRAM(s) Oral daily  dextrose 5%. 1000 milliLiter(s) (50 mL/Hr) IV Continuous <Continuous>  dextrose 50% Injectable 12.5 Gram(s) IV Push once  dextrose 50% Injectable 25 Gram(s) IV Push once  dextrose 50% Injectable 25 Gram(s) IV Push once  enoxaparin Injectable 40 milliGRAM(s) SubCutaneous daily  gabapentin 100 milliGRAM(s) Oral two times a day  insulin lispro (HumaLOG) corrective regimen sliding scale   SubCutaneous three times a day before meals  piperacillin/tazobactam IVPB. 3.375 Gram(s) IV Intermittent every 8 hours  potassium acid phosphate/sodium acid phosphate tablet (K-PHOS No. 2) 1 Tablet(s) Oral four times a day with meals  sodium chloride 0.9%. 1000 milliLiter(s) (75 mL/Hr) IV Continuous <Continuous>    MEDICATIONS  (PRN):  dextrose Gel 1 Dose(s) Oral once PRN Blood Glucose LESS THAN 70 milliGRAM(s)/deciliter  glucagon  Injectable 1 milliGRAM(s) IntraMuscular once PRN Glucose LESS THAN 70 milligrams/deciliter      Vital Signs Last 24 Hrs  T(C): 36.4 (02 Apr 2018 05:57), Max: 36.7 (01 Apr 2018 14:17)  T(F): 97.6 (02 Apr 2018 05:57), Max: 98 (01 Apr 2018 14:17)  HR: 59 (02 Apr 2018 05:57) (59 - 78)  BP: 112/66 (02 Apr 2018 05:57) (104/68 - 116/63)  RR: 18 (02 Apr 2018 05:57) (16 - 18)  SpO2: 97% (02 Apr 2018 05:57) (95% - 97%)        REVIEW OF SYSTEMS: denies fever, chills, SOB, palpitations, chest pain, dizziness  All other ROS are negative    PHYSICAL EXAM:  GENERAL: no distress, well developed, well-groomed   HEAD:  Atraumatic, Normocephalic  EYES: PERRL  ENMT: moist mucus membranes  NECK: Supple  NERVOUS SYSTEM:  Alert & Oriented X3, no focal deficits   CHEST/LUNG: clear to ausculation bilaterally   HEART: Regular rate and rhythm; No murmurs, rubs, or gallops  ABDOMEN: Soft, nondistended, nontender   EXTREMITIES: No clubbing, cyanosis, or edema  SKIN: No rashes or lesions    LABS:                                                  11.2   4.7   )-----------( 266      ( 02 Apr 2018 06:35 )             35.0             04-02    142  |  107  |  11  ----------------------------<  156<H>  3.9   |  28  |  0.65    Ca    9.1      02 Apr 2018 06:35  Phos  3.6     04-02  Mg     2.0     04-02    TPro  7.3  /  Alb  3.1<L>  /  TBili  0.4  /  DBili  x   /  AST  12  /  ALT  11  /  AlkPhos  61  04-02

## 2018-04-02 NOTE — PROGRESS NOTE ADULT - PROBLEM SELECTOR PLAN 1
- continue zosyn (day 5)  - tolerating clear liquid diet, will advance after discussing with surgery   - repeat CT abdomen showed no perforation   - blood cultures negative  - surgery follow-up  - ID : Dr. Elliott

## 2018-04-02 NOTE — PROGRESS NOTE ADULT - PROBLEM SELECTOR PLAN 2
- HbA1c is 9.8, will need lantus and tighter glycemic control when patient's diet is advanced   - follow accuchecks  - continue humalog sliding scale  - patient has burning/tingling in hands/feet (longstanding) likely due to diabetic neuropathy- began gabapentin 100mg twice daily

## 2018-04-03 RX ADMIN — Medication 1 TABLET(S): at 11:44

## 2018-04-03 RX ADMIN — PIPERACILLIN AND TAZOBACTAM 25 GRAM(S): 4; .5 INJECTION, POWDER, LYOPHILIZED, FOR SOLUTION INTRAVENOUS at 09:00

## 2018-04-03 RX ADMIN — Medication 1: at 11:43

## 2018-04-03 RX ADMIN — Medication 81 MILLIGRAM(S): at 11:45

## 2018-04-03 RX ADMIN — Medication 1: at 08:22

## 2018-04-03 RX ADMIN — GABAPENTIN 100 MILLIGRAM(S): 400 CAPSULE ORAL at 17:27

## 2018-04-03 RX ADMIN — Medication 1 TABLET(S): at 08:22

## 2018-04-03 RX ADMIN — Medication 1: at 17:11

## 2018-04-03 RX ADMIN — PIPERACILLIN AND TAZOBACTAM 25 GRAM(S): 4; .5 INJECTION, POWDER, LYOPHILIZED, FOR SOLUTION INTRAVENOUS at 17:27

## 2018-04-03 RX ADMIN — SIMVASTATIN 10 MILLIGRAM(S): 20 TABLET, FILM COATED ORAL at 21:29

## 2018-04-03 RX ADMIN — GABAPENTIN 100 MILLIGRAM(S): 400 CAPSULE ORAL at 05:05

## 2018-04-03 RX ADMIN — Medication 1 TABLET(S): at 17:27

## 2018-04-03 RX ADMIN — PIPERACILLIN AND TAZOBACTAM 25 GRAM(S): 4; .5 INJECTION, POWDER, LYOPHILIZED, FOR SOLUTION INTRAVENOUS at 00:44

## 2018-04-03 RX ADMIN — ENOXAPARIN SODIUM 40 MILLIGRAM(S): 100 INJECTION SUBCUTANEOUS at 11:44

## 2018-04-03 NOTE — PROGRESS NOTE ADULT - SUBJECTIVE AND OBJECTIVE BOX
INTERVAL HPI/OVERNIGHT EVENTS:    Pt seen and examined at bedside. Offers no acute complaints at this time, Lower abd pain resolved, no nausea, vomiting. Denies fever, chills, SOB or CP. +f/no BM     Vital Signs Last 24 Hrs  T(C): 36.6 (03 Apr 2018 05:05), Max: 36.7 (02 Apr 2018 20:37)  T(F): 97.9 (03 Apr 2018 05:05), Max: 98.1 (02 Apr 2018 20:37)  HR: 70 (03 Apr 2018 05:05) (65 - 71)  BP: 109/56 (03 Apr 2018 05:05) (109/56 - 121/64)  BP(mean): --  RR: 18 (03 Apr 2018 05:05) (16 - 18)  SpO2: 97% (03 Apr 2018 05:05) (97% - 100%)  I&O's Detail    piperacillin/tazobactam IVPB. 3.375 Gram(s) IV Intermittent every 8 hours      Physical Exam  General: AAOx3, No acute distress  Skin: No jaundice, no icterus  Abdomen: soft, nondistended, nontender, no rebound tenderness, no guarding, no palpable masses  Extremities: non edematous, no calf pain bilaterally      RADIOLOGY & ADDITIONAL STUDIES:  < from: CT Abdomen and Pelvis w/ Oral Cont and w/ IV Cont (04.02.18 @ 19:56) >  COMPARISON: 3/31/18 and 3/28/2018    FINDINGS:    The liver is normal in attenuation and size. No focal hepatic masses are   identified. There is no evidence for intrahepatic or extrahepatic biliary   dilatation. No gallstones, gallbladder wall thickening or pericholecystic   fluid identified.    The spleen, pancreas and adrenal glands are unremarkable.    The kidneys enhance bilaterally, without evidence for space-occupying   lesion or hydronephrosis. The abdominal aorta is normal in course and   caliber. No abnormally enlarged retroperitoneal or pelvic lymphadenopathy   is noted.    There is continued evidence for pathologic thickening of the proximal   sigmoid colon with infiltration of the surrounding fat, stable. Foci of   air attenuation identified along thesuperior margin of the sigmoid colon   is without significant interval change, likely representative of a small   microperforation. There is no evidence for mechanical bowel obstruction.   No free intraperitoneal fluid is identified. There is no evidence for   acute appendicitis.    The uterus and urinary bladder appear unremarkable.     Imaging of the lung bases and osseous structures appears unremarkable.    IMPRESSION:  There is continued evidence for pathologic thickening of the   proximal sigmoid colon with infiltration of the surrounding fat, stable.   Foci of air attenuation identified along the superior margin of the   sigmoid colon is without significant interval change, likely   representative of a small microperforation. There is no evidence for   mechanical bowel obstruction.     < end of copied text >

## 2018-04-03 NOTE — PROGRESS NOTE ADULT - SUBJECTIVE AND OBJECTIVE BOX
Patient is a 72 year-old female with PMH of DM, HLD who presented with chills and is admitted for sigmoid diverticulitis with localized perforation.      INTERVAL HPI/OVERNIGHT EVENTS: No events overnight. Patient seen and examined at bedside. Reports improvement in abdominal pain.  Denies other complaints     MEDICATIONS  (STANDING):  aspirin  chewable 81 milliGRAM(s) Oral daily  dextrose 5%. 1000 milliLiter(s) (50 mL/Hr) IV Continuous <Continuous>  dextrose 50% Injectable 12.5 Gram(s) IV Push once  dextrose 50% Injectable 25 Gram(s) IV Push once  dextrose 50% Injectable 25 Gram(s) IV Push once  enoxaparin Injectable 40 milliGRAM(s) SubCutaneous daily  gabapentin 100 milliGRAM(s) Oral two times a day  insulin lispro (HumaLOG) corrective regimen sliding scale   SubCutaneous three times a day before meals  piperacillin/tazobactam IVPB. 3.375 Gram(s) IV Intermittent every 8 hours  potassium acid phosphate/sodium acid phosphate tablet (K-PHOS No. 2) 1 Tablet(s) Oral four times a day with meals  simvastatin 10 milliGRAM(s) Oral at bedtime    MEDICATIONS  (PRN):  dextrose Gel 1 Dose(s) Oral once PRN Blood Glucose LESS THAN 70 milliGRAM(s)/deciliter  glucagon  Injectable 1 milliGRAM(s) IntraMuscular once PRN Glucose LESS THAN 70 milligrams/deciliter        Vital Signs Last 24 Hrs  T(C): 36.6 (03 Apr 2018 05:05), Max: 36.7 (02 Apr 2018 20:37)  T(F): 97.9 (03 Apr 2018 05:05), Max: 98.1 (02 Apr 2018 20:37)  HR: 70 (03 Apr 2018 05:05) (65 - 71)  BP: 109/56 (03 Apr 2018 05:05) (109/56 - 121/64)  BP(mean): --  RR: 18 (03 Apr 2018 05:05) (16 - 18)  SpO2: 97% (03 Apr 2018 05:05) (97% - 100%)        REVIEW OF SYSTEMS: denies fever, chills, SOB, palpitations, chest pain, dizziness  All other ROS are negative    PHYSICAL EXAM:  GENERAL: no distress, well developed, well-groomed   HEAD:  Atraumatic, Normocephalic  EYES: PERRL  ENMT: moist mucus membranes  NECK: Supple  NERVOUS SYSTEM:  Alert & Oriented X3, no focal deficits   CHEST/LUNG: clear to ausculation bilaterally   HEART: Regular rate and rhythm; No murmurs, rubs, or gallops  ABDOMEN: Soft, nondistended, nontender   EXTREMITIES: No clubbing, cyanosis, or edema  SKIN: No rashes or lesions    LABS:                                                                     11.2   4.7   )-----------( 266      ( 02 Apr 2018 06:35 )             35.0             04-02    142  |  107  |  11  ----------------------------<  156<H>  3.9   |  28  |  0.65    Ca    9.1      02 Apr 2018 06:35  Phos  3.6     04-02  Mg     2.0     04-02    TPro  7.3  /  Alb  3.1<L>  /  TBili  0.4  /  DBili  x   /  AST  12  /  ALT  11  /  AlkPhos  61  04-02 Patient is a 72 year-old female with PMH of DM, HLD who presented with chills and is admitted for sigmoid diverticulitis with localized perforation.      INTERVAL HPI/OVERNIGHT EVENTS: No events overnight. Patient seen and examined at bedside. Reports improvement in abdominal pain.  Denies other complaints     MEDICATIONS  (STANDING):  aspirin  chewable 81 milliGRAM(s) Oral daily  dextrose 5%. 1000 milliLiter(s) (50 mL/Hr) IV Continuous <Continuous>  dextrose 50% Injectable 12.5 Gram(s) IV Push once  dextrose 50% Injectable 25 Gram(s) IV Push once  dextrose 50% Injectable 25 Gram(s) IV Push once  enoxaparin Injectable 40 milliGRAM(s) SubCutaneous daily  gabapentin 100 milliGRAM(s) Oral two times a day  insulin lispro (HumaLOG) corrective regimen sliding scale   SubCutaneous three times a day before meals  piperacillin/tazobactam IVPB. 3.375 Gram(s) IV Intermittent every 8 hours  potassium acid phosphate/sodium acid phosphate tablet (K-PHOS No. 2) 1 Tablet(s) Oral four times a day with meals  simvastatin 10 milliGRAM(s) Oral at bedtime    MEDICATIONS  (PRN):  dextrose Gel 1 Dose(s) Oral once PRN Blood Glucose LESS THAN 70 milliGRAM(s)/deciliter  glucagon  Injectable 1 milliGRAM(s) IntraMuscular once PRN Glucose LESS THAN 70 milligrams/deciliter        Vital Signs Last 24 Hrs  T(C): 36.6 (03 Apr 2018 05:05), Max: 36.7 (02 Apr 2018 20:37)  T(F): 97.9 (03 Apr 2018 05:05), Max: 98.1 (02 Apr 2018 20:37)  HR: 70 (03 Apr 2018 05:05) (65 - 71)  BP: 109/56 (03 Apr 2018 05:05) (109/56 - 121/64)  RR: 18 (03 Apr 2018 05:05) (16 - 18)  SpO2: 97% (03 Apr 2018 05:05) (97% - 100%)        REVIEW OF SYSTEMS: denies fever, chills, SOB, palpitations, chest pain, dizziness  All other ROS are negative    PHYSICAL EXAM:  GENERAL: no distress, well developed, well-groomed   HEAD:  Atraumatic, Normocephalic  EYES: PERRL  ENMT: moist mucus membranes  NECK: Supple  NERVOUS SYSTEM:  Alert & Oriented X3, no focal deficits   CHEST/LUNG: clear to ausculation bilaterally   HEART: Regular rate and rhythm; No murmurs, rubs, or gallops  ABDOMEN: Soft, nondistended, nontender   EXTREMITIES: No clubbing, cyanosis, or edema  SKIN: No rashes or lesions    LABS:                                                                     11.2   4.7   )-----------( 266      ( 02 Apr 2018 06:35 )             35.0             04-02    142  |  107  |  11  ----------------------------<  156<H>  3.9   |  28  |  0.65    Ca    9.1      02 Apr 2018 06:35  Phos  3.6     04-02  Mg     2.0     04-02    TPro  7.3  /  Alb  3.1<L>  /  TBili  0.4  /  DBili  x   /  AST  12  /  ALT  11  /  AlkPhos  61  04-02

## 2018-04-03 NOTE — PROGRESS NOTE ADULT - PROBLEM SELECTOR PLAN 2
- HbA1c is 9.8, will need lantus and tighter glycemic control when patient's diet is advanced   - follow accuchecks  - continue humalog sliding scale  - patient has burning/tingling in hands/feet (longstanding) likely due to diabetic neuropathy- began gabapentin 100mg twice daily - HbA1c is 9.8, will need lantus and tighter glycemic control when patient's diet is advanced   - follow accuchecks.   - continue humalog sliding scale  - patient has burning/tingling in hands/feet (longstanding) likely due to diabetic neuropathy- began gabapentin 100mg twice daily

## 2018-04-03 NOTE — PROGRESS NOTE ADULT - SUBJECTIVE AND OBJECTIVE BOX
73 y/o female is under our care for acute perforated diverticulitis.  Patient under repeat CT A/P yesterday which showed persistent acute diverticulitis with microperf. Patient admits that her abdominal pain has resolved today even when getting out of bed. Remains on clear liquid diet.      REVIEW OF SYSTEMS:  [  ] Not able to illicit  General: no fevers no malaise  Chest: no cough no sob  GI: no nvd no abd pain  Skin: no rashes    ALLERGIES: penicillin (Rash)    MEDS:  piperacillin/tazobactam IVPB. 3.375 Gram(s) IV Intermittent every 8 hours    VITALS:  Vital Signs Last 24 Hrs  T(C): 36.6 (03 Apr 2018 05:05), Max: 36.7 (02 Apr 2018 20:37)  T(F): 97.9 (03 Apr 2018 05:05), Max: 98.1 (02 Apr 2018 20:37)  HR: 70 (03 Apr 2018 05:05) (65 - 71)  BP: 109/56 (03 Apr 2018 05:05) (109/56 - 121/64)  BP(mean): --  RR: 18 (03 Apr 2018 05:05) (16 - 18)  SpO2: 97% (03 Apr 2018 05:05) (97% - 100%)      PHYSICAL EXAM:  HEENT: n/a  Neck: supple no LN's   Respiratory: lungs clear no rales no rhonchi  Cardiovascular: S1 S2 reg no murmurs  Gastrointestinal: +BS with soft, nondistended abdomen; nontender  Extremities: no edema   Skin: no rashes  Ortho: n/a  Neuro: AAO x 3      LABS/DIAGNOSTIC TESTS: No new labs      CULTURES:   .Blood Blood-Peripheral  03-31 @ 23:02   No growth to date.  --  --      .Blood Blood-Peripheral  03-28 @ 10:12   Growth in anaerobic bottle: Eubacterium limosum  Growth in anaerobic bottle: Gram Positive Rods      .Urine Clean Catch (Midstream)  03-28 @ 10:06   <10,000 CFU/ml Normal Urogenital adolfo present  --  --      RADIOLOGY:  EXAM:  CT ABDOMEN AND PELVIS OC IC                        PROCEDURE DATE:  04/02/2018    INTERPRETATION:  CLINICAL HISTORY:  Sigmoid diverticulitis; evaluate for   perforation.    Multiple axial images of the abdomen and pelvis were obtained from the   lung bases through pubic symphysis with the administration of oral   contrast. 90 cc of Omnipaque 350 was administered intravenously without   complication. Reformatted coronal and sagittal images are submitted.    COMPARISON: 3/31/18 and 3/28/2018    FINDINGS:    The liver is normal in attenuation and size. No focal hepatic masses are   identified. There is no evidence for intrahepatic or extrahepatic biliary   dilatation. No gallstones, gallbladder wall thickening or pericholecystic   fluid identified.    The spleen, pancreas and adrenal glands are unremarkable.    The kidneys enhance bilaterally, without evidence for space-occupying   lesion or hydronephrosis. The abdominal aorta is normal in course and   caliber. No abnormally enlarged retroperitoneal or pelvic lymphadenopathy   is noted.    There is continued evidence for pathologic thickening of the proximal   sigmoid colon with infiltration of the surrounding fat, stable. Foci of   air attenuation identified along the superior margin of the sigmoid colon   is without significant interval change, likely representative of a small   microperforation. There is no evidence for mechanical bowel obstruction.   No free intraperitoneal fluid is identified. There is no evidence for   acute appendicitis.    The uterus and urinary bladder appear unremarkable.     Imaging of the lung bases and osseous structures appears unremarkable.    IMPRESSION:  There is continued evidence for pathologic thickening of the   proximal sigmoid colon with infiltration of the surrounding fat, stable.   Foci of air attenuation identified along the superior margin of the   sigmoid colon is without significant interval change, likely   representative of a small microperforation. There is no evidence for   mechanical bowel obstruction.

## 2018-04-03 NOTE — PROGRESS NOTE ADULT - PROBLEM SELECTOR PLAN 1
- continue zosyn (day 6)  - tolerating clear liquid diet, TO BE CONTINUED AS PER SURGERY   - repeat CT abdomen with contrast unchanged  - blood cultures negative  - surgery follow-up  - ID : Dr. Elliott

## 2018-04-04 LAB
ANION GAP SERPL CALC-SCNC: 3 MMOL/L — LOW (ref 5–17)
BUN SERPL-MCNC: 4 MG/DL — LOW (ref 7–18)
CALCIUM SERPL-MCNC: 9.2 MG/DL — SIGNIFICANT CHANGE UP (ref 8.4–10.5)
CHLORIDE SERPL-SCNC: 109 MMOL/L — HIGH (ref 96–108)
CO2 SERPL-SCNC: 30 MMOL/L — SIGNIFICANT CHANGE UP (ref 22–31)
CREAT SERPL-MCNC: 0.65 MG/DL — SIGNIFICANT CHANGE UP (ref 0.5–1.3)
GLUCOSE SERPL-MCNC: 157 MG/DL — HIGH (ref 70–99)
MAGNESIUM SERPL-MCNC: 2 MG/DL — SIGNIFICANT CHANGE UP (ref 1.6–2.6)
PHOSPHATE SERPL-MCNC: 3.7 MG/DL — SIGNIFICANT CHANGE UP (ref 2.5–4.5)
POTASSIUM SERPL-MCNC: 3.9 MMOL/L — SIGNIFICANT CHANGE UP (ref 3.5–5.3)
POTASSIUM SERPL-SCNC: 3.9 MMOL/L — SIGNIFICANT CHANGE UP (ref 3.5–5.3)
SODIUM SERPL-SCNC: 142 MMOL/L — SIGNIFICANT CHANGE UP (ref 135–145)

## 2018-04-04 RX ADMIN — Medication 1: at 08:27

## 2018-04-04 RX ADMIN — Medication 2: at 16:40

## 2018-04-04 RX ADMIN — SIMVASTATIN 10 MILLIGRAM(S): 20 TABLET, FILM COATED ORAL at 22:21

## 2018-04-04 RX ADMIN — Medication 81 MILLIGRAM(S): at 12:06

## 2018-04-04 RX ADMIN — GABAPENTIN 100 MILLIGRAM(S): 400 CAPSULE ORAL at 05:34

## 2018-04-04 RX ADMIN — PIPERACILLIN AND TAZOBACTAM 25 GRAM(S): 4; .5 INJECTION, POWDER, LYOPHILIZED, FOR SOLUTION INTRAVENOUS at 16:40

## 2018-04-04 RX ADMIN — GABAPENTIN 100 MILLIGRAM(S): 400 CAPSULE ORAL at 17:18

## 2018-04-04 RX ADMIN — Medication 1: at 12:06

## 2018-04-04 RX ADMIN — PIPERACILLIN AND TAZOBACTAM 25 GRAM(S): 4; .5 INJECTION, POWDER, LYOPHILIZED, FOR SOLUTION INTRAVENOUS at 01:11

## 2018-04-04 RX ADMIN — ENOXAPARIN SODIUM 40 MILLIGRAM(S): 100 INJECTION SUBCUTANEOUS at 12:06

## 2018-04-04 NOTE — PROGRESS NOTE ADULT - SUBJECTIVE AND OBJECTIVE BOX
71 y/o female is under our care for acute perforated diverticulitis.  Patient is doing better, but has concerns that diarrhea continues. Had 3 episodes today so far.  Since patient has been on clear liquid diet, will increase to soft diet to see if diarrhea improves.  Remains afebrile with normal wbc count.     REVIEW OF SYSTEMS:  [  ] Not able to illicit  General: no fevers no malaise  Chest: no cough no sob  GI: no nv +diarrhea no abd pain  Skin: no rashes    ALLERGIES: penicillin (Rash)    MEDS:  piperacillin/tazobactam IVPB. 3.375 Gram(s) IV Intermittent every 8 hours    VITALS:  Vital Signs Last 24 Hrs  T(C): 36.6 (04 Apr 2018 05:05), Max: 36.7 (03 Apr 2018 13:27)  T(F): 97.8 (04 Apr 2018 05:05), Max: 98 (03 Apr 2018 13:27)  HR: 58 (04 Apr 2018 05:05) (58 - 73)  BP: 124/68 (04 Apr 2018 05:05) (106/66 - 124/68)  BP(mean): --  RR: 17 (04 Apr 2018 05:05) (16 - 18)  SpO2: 97% (04 Apr 2018 05:05) (97% - 98%)      PHYSICAL EXAM:  HEENT: n/a  Neck: supple no LN's   Respiratory: lungs clear no rales no rhonchi  Cardiovascular: S1 S2 reg no murmurs  Gastrointestinal: +BS with soft, nondistended abdomen; nontender  Extremities: no edema   Skin: no rashes  Ortho: n/a  Neuro: AAO x 3      LABS/DIAGNOSTIC TESTS:   04-04    142  |  109<H>  |  4<L>  ----------------------------<  157<H>  3.9   |  30  |  0.65    Ca    9.2      04 Apr 2018 06:02  Phos  3.7     04-04  Mg     2.0     04-04      CULTURES:   .Blood Blood-Peripheral  03-31 @ 23:02   No growth to date.  --  --      .Blood Blood-Peripheral  03-28 @ 10:12   Growth in anaerobic bottle: Eubacterium limosum  Growth in anaerobic bottle: Gram Positive Rods      .Urine Clean Catch (Midstream)  03-28 @ 10:06   <10,000 CFU/ml Normal Urogenital adolfo present  --  --      RADIOLOGY: no new studies

## 2018-04-04 NOTE — PROGRESS NOTE ADULT - PROBLEM SELECTOR PLAN 2
- HbA1c is 9.8, will need lantus and tighter glycemic control when patient's diet is advanced   - follow accuchecks.   - continue humalog sliding scale  - patient has burning/tingling in hands/feet (longstanding) likely due to diabetic neuropathy- began gabapentin 100mg twice daily

## 2018-04-04 NOTE — PROGRESS NOTE ADULT - PROBLEM SELECTOR PLAN 1
- continue zosyn (day 7)  - advanced diet to soft   - repeat CT abdomen with contrast unchanged  - blood cultures negative  - surgery follow-up  - ID : Dr. Elliott

## 2018-04-04 NOTE — PROGRESS NOTE ADULT - SUBJECTIVE AND OBJECTIVE BOX
Patient is a 72 year-old female with PMH of DM, HLD who presented with chills and is admitted for sigmoid diverticulitis with localized perforation.      INTERVAL HPI/OVERNIGHT EVENTS: No events overnight. Patient seen and examined at bedside. Reports resolution of abdominal pain.  Denies other complaints. Initially was refusing zosyn but now agrees.     MEDICATIONS  (STANDING):  aspirin  chewable 81 milliGRAM(s) Oral daily  dextrose 5%. 1000 milliLiter(s) (50 mL/Hr) IV Continuous <Continuous>  dextrose 50% Injectable 12.5 Gram(s) IV Push once  dextrose 50% Injectable 25 Gram(s) IV Push once  dextrose 50% Injectable 25 Gram(s) IV Push once  enoxaparin Injectable 40 milliGRAM(s) SubCutaneous daily  gabapentin 100 milliGRAM(s) Oral two times a day  insulin lispro (HumaLOG) corrective regimen sliding scale   SubCutaneous three times a day before meals  piperacillin/tazobactam IVPB. 3.375 Gram(s) IV Intermittent every 8 hours  simvastatin 10 milliGRAM(s) Oral at bedtime    MEDICATIONS  (PRN):  dextrose Gel 1 Dose(s) Oral once PRN Blood Glucose LESS THAN 70 milliGRAM(s)/deciliter  glucagon  Injectable 1 milliGRAM(s) IntraMuscular once PRN Glucose LESS THAN 70 milligrams/deciliter          Vital Signs Last 24 Hrs  Vital Signs Last 24 Hrs  T(C): 36.6 (04 Apr 2018 13:59), Max: 36.6 (03 Apr 2018 20:25)  T(F): 97.8 (04 Apr 2018 13:59), Max: 97.8 (03 Apr 2018 20:25)  HR: 74 (04 Apr 2018 13:59) (58 - 74)  BP: 114/73 (04 Apr 2018 13:59) (114/73 - 124/68)  BP(mean): --  RR: 17 (04 Apr 2018 13:59) (17 - 18)  SpO2: 99% (04 Apr 2018 13:59) (97% - 99%)        REVIEW OF SYSTEMS: denies fever, chills, SOB, palpitations, chest pain, dizziness  All other ROS are negative    PHYSICAL EXAM:  GENERAL: no distress, well developed, well-groomed   HEAD:  Atraumatic, Normocephalic  EYES: PERRL  ENMT: moist mucus membranes  NECK: Supple  NERVOUS SYSTEM:  Alert & Oriented X3, no focal deficits   CHEST/LUNG: clear to ausculation bilaterally   HEART: Regular rate and rhythm; No murmurs, rubs, or gallops  ABDOMEN: Soft, nondistended, nontender to palpation  EXTREMITIES: No clubbing, cyanosis, or edema  SKIN: No rashes or lesions    LABS:                                                                     11.2   4.7   )-----------( 266      ( 02 Apr 2018 06:35 )             35.0             04-02    142  |  107  |  11  ----------------------------<  156<H>  3.9   |  28  |  0.65    Ca    9.1      02 Apr 2018 06:35  Phos  3.6     04-02  Mg     2.0     04-02    TPro  7.3  /  Alb  3.1<L>  /  TBili  0.4  /  DBili  x   /  AST  12  /  ALT  11  /  AlkPhos  61  04-02

## 2018-04-04 NOTE — PROGRESS NOTE ADULT - SUBJECTIVE AND OBJECTIVE BOX
INTERVAL HPI/OVERNIGHT EVENTS:  Pt stable.   Tolerating diet, PO fluids  flatus/BM.    Vital Signs Last 24 Hrs  T(C): 36.6 (04 Apr 2018 13:59), Max: 36.6 (03 Apr 2018 20:25)  T(F): 97.8 (04 Apr 2018 13:59), Max: 97.8 (03 Apr 2018 20:25)  HR: 74 (04 Apr 2018 13:59) (58 - 74)  BP: 114/73 (04 Apr 2018 13:59) (114/73 - 124/68)  BP(mean): --  RR: 17 (04 Apr 2018 13:59) (17 - 18)  SpO2: 99% (04 Apr 2018 13:59) (97% - 99%)    Physical:  Abdomen: Soft nondistended, nontender.  No masses  No complaints    I&O's Summary      LABS:            04-04    142  |  109<H>  |  4<L>  ----------------------------<  157<H>  3.9   |  30  |  0.65    Ca    9.2      04 Apr 2018 06:02  Phos  3.7     04-04  Mg     2.0     04-04

## 2018-04-04 NOTE — PROGRESS NOTE ADULT - SUBJECTIVE AND OBJECTIVE BOX
pt s- complaint. no pain no n/v  normal bm yesterday. Pt tolerating clears  vss a/f  Abd: soft nt nd

## 2018-04-05 LAB
ANION GAP SERPL CALC-SCNC: 5 MMOL/L — SIGNIFICANT CHANGE UP (ref 5–17)
BASOPHILS # BLD AUTO: 0.1 K/UL — SIGNIFICANT CHANGE UP (ref 0–0.2)
BASOPHILS NFR BLD AUTO: 1.1 % — SIGNIFICANT CHANGE UP (ref 0–2)
BUN SERPL-MCNC: 6 MG/DL — LOW (ref 7–18)
CALCIUM SERPL-MCNC: 9.3 MG/DL — SIGNIFICANT CHANGE UP (ref 8.4–10.5)
CHLORIDE SERPL-SCNC: 106 MMOL/L — SIGNIFICANT CHANGE UP (ref 96–108)
CO2 SERPL-SCNC: 28 MMOL/L — SIGNIFICANT CHANGE UP (ref 22–31)
CREAT SERPL-MCNC: 0.76 MG/DL — SIGNIFICANT CHANGE UP (ref 0.5–1.3)
EOSINOPHIL # BLD AUTO: 0.1 K/UL — SIGNIFICANT CHANGE UP (ref 0–0.5)
EOSINOPHIL NFR BLD AUTO: 1.6 % — SIGNIFICANT CHANGE UP (ref 0–6)
GLUCOSE SERPL-MCNC: 179 MG/DL — HIGH (ref 70–99)
HCT VFR BLD CALC: 37.6 % — SIGNIFICANT CHANGE UP (ref 34.5–45)
HGB BLD-MCNC: 12 G/DL — SIGNIFICANT CHANGE UP (ref 11.5–15.5)
LYMPHOCYTES # BLD AUTO: 1.8 K/UL — SIGNIFICANT CHANGE UP (ref 1–3.3)
LYMPHOCYTES # BLD AUTO: 31.3 % — SIGNIFICANT CHANGE UP (ref 13–44)
MAGNESIUM SERPL-MCNC: 2.1 MG/DL — SIGNIFICANT CHANGE UP (ref 1.6–2.6)
MCHC RBC-ENTMCNC: 28.2 PG — SIGNIFICANT CHANGE UP (ref 27–34)
MCHC RBC-ENTMCNC: 31.8 GM/DL — LOW (ref 32–36)
MCV RBC AUTO: 88.6 FL — SIGNIFICANT CHANGE UP (ref 80–100)
MONOCYTES # BLD AUTO: 0.4 K/UL — SIGNIFICANT CHANGE UP (ref 0–0.9)
MONOCYTES NFR BLD AUTO: 7.2 % — SIGNIFICANT CHANGE UP (ref 2–14)
NEUTROPHILS # BLD AUTO: 3.3 K/UL — SIGNIFICANT CHANGE UP (ref 1.8–7.4)
NEUTROPHILS NFR BLD AUTO: 58.7 % — SIGNIFICANT CHANGE UP (ref 43–77)
PHOSPHATE SERPL-MCNC: 3.1 MG/DL — SIGNIFICANT CHANGE UP (ref 2.5–4.5)
PLATELET # BLD AUTO: 329 K/UL — SIGNIFICANT CHANGE UP (ref 150–400)
POTASSIUM SERPL-MCNC: 4.2 MMOL/L — SIGNIFICANT CHANGE UP (ref 3.5–5.3)
POTASSIUM SERPL-SCNC: 4.2 MMOL/L — SIGNIFICANT CHANGE UP (ref 3.5–5.3)
RBC # BLD: 4.24 M/UL — SIGNIFICANT CHANGE UP (ref 3.8–5.2)
RBC # FLD: 12.6 % — SIGNIFICANT CHANGE UP (ref 10.3–14.5)
SODIUM SERPL-SCNC: 139 MMOL/L — SIGNIFICANT CHANGE UP (ref 135–145)
WBC # BLD: 5.6 K/UL — SIGNIFICANT CHANGE UP (ref 3.8–10.5)
WBC # FLD AUTO: 5.6 K/UL — SIGNIFICANT CHANGE UP (ref 3.8–10.5)

## 2018-04-05 RX ORDER — INSULIN GLARGINE 100 [IU]/ML
10 INJECTION, SOLUTION SUBCUTANEOUS AT BEDTIME
Refills: 0 | Status: DISCONTINUED | OUTPATIENT
Start: 2018-04-05 | End: 2018-04-05

## 2018-04-05 RX ORDER — INSULIN GLARGINE 100 [IU]/ML
6 INJECTION, SOLUTION SUBCUTANEOUS AT BEDTIME
Refills: 0 | Status: DISCONTINUED | OUTPATIENT
Start: 2018-04-05 | End: 2018-04-06

## 2018-04-05 RX ADMIN — PIPERACILLIN AND TAZOBACTAM 25 GRAM(S): 4; .5 INJECTION, POWDER, LYOPHILIZED, FOR SOLUTION INTRAVENOUS at 08:27

## 2018-04-05 RX ADMIN — ENOXAPARIN SODIUM 40 MILLIGRAM(S): 100 INJECTION SUBCUTANEOUS at 11:53

## 2018-04-05 RX ADMIN — INSULIN GLARGINE 6 UNIT(S): 100 INJECTION, SOLUTION SUBCUTANEOUS at 21:42

## 2018-04-05 RX ADMIN — PIPERACILLIN AND TAZOBACTAM 25 GRAM(S): 4; .5 INJECTION, POWDER, LYOPHILIZED, FOR SOLUTION INTRAVENOUS at 18:08

## 2018-04-05 RX ADMIN — PIPERACILLIN AND TAZOBACTAM 25 GRAM(S): 4; .5 INJECTION, POWDER, LYOPHILIZED, FOR SOLUTION INTRAVENOUS at 00:40

## 2018-04-05 RX ADMIN — SIMVASTATIN 10 MILLIGRAM(S): 20 TABLET, FILM COATED ORAL at 21:42

## 2018-04-05 RX ADMIN — GABAPENTIN 100 MILLIGRAM(S): 400 CAPSULE ORAL at 05:52

## 2018-04-05 RX ADMIN — GABAPENTIN 100 MILLIGRAM(S): 400 CAPSULE ORAL at 17:26

## 2018-04-05 RX ADMIN — Medication 1: at 08:13

## 2018-04-05 RX ADMIN — Medication 1: at 11:53

## 2018-04-05 RX ADMIN — Medication 81 MILLIGRAM(S): at 11:53

## 2018-04-05 RX ADMIN — Medication 1: at 16:32

## 2018-04-05 NOTE — PROGRESS NOTE ADULT - PROBLEM SELECTOR PROBLEM 4
Hypophosphatemia
Need for prophylactic measure

## 2018-04-05 NOTE — PROGRESS NOTE ADULT - PROBLEM SELECTOR PROBLEM 1
Sigmoid diverticulitis

## 2018-04-05 NOTE — PROGRESS NOTE ADULT - PROBLEM SELECTOR PLAN 4
-Need for prophylactic measure.  Plan: [] Previous VTE                                                3  [] Thrombophilia                                             2  [] Lower limb paralysis                                   2    [] Current Cancer                                             2   [X] Immobilization > 24 hrs                              1  [] ICU/CCU stay > 24 hours                             1  [X] Age > 60                                                         1    IMPROVE VTE Score: 2  lovenox.
Ph replaced, improving, monitor
-Need for prophylactic measure.  Plan: [] Previous VTE                                                3  [] Thrombophilia                                             2  [] Lower limb paralysis                                   2    [] Current Cancer                                             2   [X] Immobilization > 24 hrs                              1  [] ICU/CCU stay > 24 hours                             1  [X] Age > 60                                                         1    IMPROVE VTE Score: 2  lovenox.

## 2018-04-05 NOTE — PROGRESS NOTE ADULT - PROVIDER SPECIALTY LIST ADULT
Infectious Disease
Infectious Disease
Surgery
Surgery
Infectious Disease
Infectious Disease
Surgery
Infectious Disease
Internal Medicine
Surgery
Internal Medicine

## 2018-04-05 NOTE — PROGRESS NOTE ADULT - PROBLEM SELECTOR PROBLEM 3
Hypercholesteremia

## 2018-04-05 NOTE — PROGRESS NOTE ADULT - SUBJECTIVE AND OBJECTIVE BOX
Patient is a 72 year-old female with PMH of DM, HLD who presented with chills and is admitted for sigmoid diverticulitis with localized perforation.      INTERVAL HPI/OVERNIGHT EVENTS: No events overnight. Patient seen and examined at bedside.  Denies complaints.     MEDICATIONS  (STANDING):  MEDICATIONS  (STANDING):  aspirin  chewable 81 milliGRAM(s) Oral daily  dextrose 5%. 1000 milliLiter(s) (50 mL/Hr) IV Continuous <Continuous>  dextrose 50% Injectable 12.5 Gram(s) IV Push once  dextrose 50% Injectable 25 Gram(s) IV Push once  dextrose 50% Injectable 25 Gram(s) IV Push once  enoxaparin Injectable 40 milliGRAM(s) SubCutaneous daily  gabapentin 100 milliGRAM(s) Oral two times a day  insulin glargine Injectable (LANTUS) 10 Unit(s) SubCutaneous at bedtime  insulin lispro (HumaLOG) corrective regimen sliding scale   SubCutaneous three times a day before meals  piperacillin/tazobactam IVPB. 3.375 Gram(s) IV Intermittent every 8 hours  simvastatin 10 milliGRAM(s) Oral at bedtime    MEDICATIONS  (PRN):  dextrose Gel 1 Dose(s) Oral once PRN Blood Glucose LESS THAN 70 milliGRAM(s)/deciliter  glucagon  Injectable 1 milliGRAM(s) IntraMuscular once PRN Glucose LESS THAN 70 milligrams/deciliter            Vital Signs Last 24 Hrs  Vital Signs Last 24 Hrs  T(C): 36.6 (05 Apr 2018 14:25), Max: 37.4 (04 Apr 2018 20:57)  T(F): 97.8 (05 Apr 2018 14:25), Max: 99.3 (04 Apr 2018 20:57)  HR: 58 (05 Apr 2018 14:25) (58 - 62)  BP: 104/55 (05 Apr 2018 14:25) (100/58 - 119/76)  BP(mean): --  RR: 18 (05 Apr 2018 14:25) (16 - 18)  SpO2: 100% (05 Apr 2018 14:25) (97% - 100%)        REVIEW OF SYSTEMS: denies fever, chills, SOB, palpitations, chest pain, dizziness  All other ROS are negative    PHYSICAL EXAM:  GENERAL: no distress, well developed, well-groomed   HEAD:  Atraumatic, Normocephalic  EYES: PERRL  ENMT: moist mucus membranes  NECK: Supple  NERVOUS SYSTEM:  Alert & Oriented X3, no focal deficits   CHEST/LUNG: clear to ausculation bilaterally   HEART: Regular rate and rhythm; No murmurs, rubs, or gallops  ABDOMEN: Soft, nondistended, nontender to palpation  EXTREMITIES: No clubbing, cyanosis, or edema  SKIN: No rashes or lesions    LABS:                                                          12.0   5.6   )-----------( 329      ( 05 Apr 2018 07:55 )             37.6             04-05    139  |  106  |  6<L>  ----------------------------<  179<H>  4.2   |  28  |  0.76    Ca    9.3      05 Apr 2018 07:55  Phos  3.1     04-05  Mg     2.1     04-05 Patient is a 72 year-old female with PMH of DM, HLD who presented with chills and is admitted for sigmoid diverticulitis with localized perforation.      INTERVAL HPI/OVERNIGHT EVENTS: No events overnight. Patient seen and examined at bedside.  Denies complaints.     MEDICATIONS  (STANDING):  MEDICATIONS  (STANDING):  aspirin  chewable 81 milliGRAM(s) Oral daily  dextrose 5%. 1000 milliLiter(s) (50 mL/Hr) IV Continuous <Continuous>  dextrose 50% Injectable 12.5 Gram(s) IV Push once  dextrose 50% Injectable 25 Gram(s) IV Push once  dextrose 50% Injectable 25 Gram(s) IV Push once  enoxaparin Injectable 40 milliGRAM(s) SubCutaneous daily  gabapentin 100 milliGRAM(s) Oral two times a day  insulin glargine Injectable (LANTUS) 10 Unit(s) SubCutaneous at bedtime  insulin lispro (HumaLOG) corrective regimen sliding scale   SubCutaneous three times a day before meals  piperacillin/tazobactam IVPB. 3.375 Gram(s) IV Intermittent every 8 hours  simvastatin 10 milliGRAM(s) Oral at bedtime    MEDICATIONS  (PRN):  dextrose Gel 1 Dose(s) Oral once PRN Blood Glucose LESS THAN 70 milliGRAM(s)/deciliter  glucagon  Injectable 1 milliGRAM(s) IntraMuscular once PRN Glucose LESS THAN 70 milligrams/deciliter            Vital Signs Last 24 Hrs  Vital Signs Last 24 Hrs  T(C): 36.6 (05 Apr 2018 14:25), Max: 37.4 (04 Apr 2018 20:57)  T(F): 97.8 (05 Apr 2018 14:25), Max: 99.3 (04 Apr 2018 20:57)  HR: 58 (05 Apr 2018 14:25) (58 - 62)  BP: 104/55 (05 Apr 2018 14:25) (100/58 - 119/76)  RR: 18 (05 Apr 2018 14:25) (16 - 18)  SpO2: 100% (05 Apr 2018 14:25) (97% - 100%)        REVIEW OF SYSTEMS: denies fever, chills, SOB, palpitations, chest pain, dizziness  All other ROS are negative    PHYSICAL EXAM:  GENERAL: no distress, well developed, well-groomed   HEAD:  Atraumatic, Normocephalic  EYES: PERRL  ENMT: moist mucus membranes  NECK: Supple  NERVOUS SYSTEM:  Alert & Oriented X3, no focal deficits   CHEST/LUNG: clear to ausculation bilaterally   HEART: Regular rate and rhythm; No murmurs, rubs, or gallops  ABDOMEN: Soft, nondistended, nontender to palpation  EXTREMITIES: No clubbing, cyanosis, or edema  SKIN: No rashes or lesions    LABS:                                                          12.0   5.6   )-----------( 329      ( 05 Apr 2018 07:55 )             37.6             04-05    139  |  106  |  6<L>  ----------------------------<  179<H>  4.2   |  28  |  0.76    Ca    9.3      05 Apr 2018 07:55  Phos  3.1     04-05  Mg     2.1     04-05

## 2018-04-05 NOTE — PROGRESS NOTE ADULT - PROBLEM SELECTOR PLAN 1
- continue zosyn (day 8)  - advanced diet to regular low fiber   - stable for discharge as per surgery  - will change to oral antibiotics at discharge   - blood cultures negative  - surgery follow-up  - ID : Dr. Elliott

## 2018-04-05 NOTE — PROGRESS NOTE ADULT - SUBJECTIVE AND OBJECTIVE BOX
71 y/o female is under our care for acute perforated diverticulitis.  Patient is doing much better and has been tolerating reg diet well. Patient showed me a picture of her morning stool and it is now formed. Remains afebrile with normal wbc count. Due for dc home today on PO antibiotics.    REVIEW OF SYSTEMS:  [  ] Not able to illicit  General: no fevers no malaise  Chest: no cough no sob  GI: no nvd no abd pain  Skin: no rashes    ALLERGIES: penicillin (Rash)    MEDS:  piperacillin/tazobactam IVPB. 3.375 Gram(s) IV Intermittent every 8 hours    VITALS:  Vital Signs Last 24 Hrs  T(C): 36.9 (05 Apr 2018 05:10), Max: 37.4 (04 Apr 2018 20:57)  T(F): 98.4 (05 Apr 2018 05:10), Max: 99.3 (04 Apr 2018 20:57)  HR: 61 (05 Apr 2018 05:10) (61 - 74)  BP: 100/58 (05 Apr 2018 05:10) (100/58 - 119/76)  BP(mean): --  RR: 16 (05 Apr 2018 05:10) (16 - 18)  SpO2: 97% (05 Apr 2018 05:10) (97% - 99%)      PHYSICAL EXAM:  HEENT: n/a  Neck: supple no LN's   Respiratory: lungs clear no rales no rhonchi  Cardiovascular: S1 S2 reg no murmurs  Gastrointestinal: +BS with soft, nondistended abdomen; nontender  Extremities: no edema   Skin: no rashes  Ortho: n/a  Neuro: AAO x 3      LABS/DIAGNOSTIC TESTS:                         12.0   5.6   )-----------( 329      ( 05 Apr 2018 07:55 )             37.6   04-05    139  |  106  |  6<L>  ----------------------------<  179<H>  4.2   |  28  |  0.76    Ca    9.3      05 Apr 2018 07:55  Phos  3.1     04-05  Mg     2.1     04-05      CULTURES:   .Blood Blood-Peripheral  03-31 @ 23:02   No growth to date.  --  --      .Blood Blood-Peripheral  03-28 @ 10:12   Growth in anaerobic bottle: Eubacterium limosum  Growth in anaerobic bottle: Gram Positive Rods      .Urine Clean Catch (Midstream)  03-28 @ 10:06   <10,000 CFU/ml Normal Urogenital adolfo present  --  --      RADIOLOGY: no new studies

## 2018-04-05 NOTE — PROGRESS NOTE ADULT - SUBJECTIVE AND OBJECTIVE BOX
INTERVAL HPI/OVERNIGHT EVENTS:  Pt stable.   tolerating low fiber diet. no nausea or vomitting  flatus/BM.    Vital Signs Last 24 Hrs  T(C): 36.9 (05 Apr 2018 05:10), Max: 37.4 (04 Apr 2018 20:57)  T(F): 98.4 (05 Apr 2018 05:10), Max: 99.3 (04 Apr 2018 20:57)  HR: 61 (05 Apr 2018 05:10) (61 - 74)  BP: 100/58 (05 Apr 2018 05:10) (100/58 - 119/76)  BP(mean): --  RR: 16 (05 Apr 2018 05:10) (16 - 18)  SpO2: 97% (05 Apr 2018 05:10) (97% - 99%)    Physical:  Abdomen: Soft nondistended, nontender.  No masses  No complaints    04-04    142  |  109<H>  |  4<L>  ----------------------------<  157<H>  3.9   |  30  |  0.65    Ca    9.2      04 Apr 2018 06:02  Phos  3.7     04-04  Mg     2.0     04-04

## 2018-04-05 NOTE — PROGRESS NOTE ADULT - ATTENDING COMMENTS
I agree with above
I agree with above
on clears
Patient seen and examined. Patient's history, vitals, labs, imaging studies reviewed. Surgery, ID input appreciated. Plan of care discussed with patient, and agrees, all questions answered.   Rox Ash MD
Patient seen and examined. Patient's history, vitals, labs, imaging studies reviewed. F/u repeat blood cultures. Plan of care discussed with patient, and agrees, all questions answered.   Rox Ash MD
Patient seen and examined. Patient's history, vitals, labs, imaging studies reviewed. Discussed with above resident, agree with note with edits. Discussed with surgeon Dr. Linton. Plan of care discussed with patient, and agrees, all questions answered.   Rox Ash MD
Patient seen and examined. Patient's history, vitals, labs, imaging studies reviewed. Discussed with above resident, agree with note with edits. Plan of care discussed with patient, and agrees, all questions answered.   Rox Ash MD
Patient seen and examined. Patient's history, vitals, labs, imaging studies reviewed. Discussed with above resident, agree with note with edits. Plan of care discussed with patient, and agrees, all questions answered.   Rox Ash MD
Patient seen and examined. Patient's history, vitals, labs, imaging studies reviewed. Discussed with above resident, agree with note with edits. D/c planning. Plan of care discussed with patient, and agrees, all questions answered.   Rox Ash MD
Patient seen and examined. Patient's history, vitals, labs, imaging studies reviewed. Discussed with resident, agree with note. F/u repeat CT abd with IV and oral contrast. Plan of care discussed with patient, and agrees, all questions answered.   Rox Ash MD
Patient seen and examined. Patient's history, vitals, labs, imaging studies reviewed. Discussed with above resident, agree with note with edits. Appreciate surgery, ID input. Plan of care discussed with patient, and agrees, all questions answered.   Rox Ash MD

## 2018-04-05 NOTE — PROGRESS NOTE ADULT - PROBLEM SELECTOR PLAN 3
- restarted simvastatin
- restart simvastatin when diet is advanced

## 2018-04-05 NOTE — PROGRESS NOTE ADULT - ASSESSMENT
1.	Acute diverticulitis - perforation has resolved  2.	Bacteremia - likely a contaminant  ·	cont zosyn 3.375gm IV q8h D5  ·	awaiting repeat CT A/P today
1.	Acute diverticulitis with lingering microperforation   ·	cont zosyn 3.375gm IV q8h D7  ·	upgrade to soft diet  ·	will monitor BM;s for now
71 y/o female with Sigmoid Diverticulitis    -CT abd/pelvis findings noted   -C/w Clear diet   -Abx as per ID   -DVT ppx   -OOB/ambulate   -Will follow
Continue NPO, IV ATB  OOB  Repeat abdominal CT 48-72 hs
Patient is a 72 year-old female with PMH of DM II, HLD who presented with chills and is admitted for sigmoid diverticulitis with localized perforation, thus, far with stable serial abdominal exams,  tolerating  diet
Patient is a 72 year-old female with PMH of DM, HLD who presented with chills and is admitted for sigmoid diverticulitis with localized perforation, tolerating clear liquid diet, abdominal tenderness improved.
Patient is a 72 year-old female with PMH of DM, HLD who presented with chills and is admitted for sigmoid diverticulitis with localized perforation, tolerating clear liquid diet, abdominal tenderness resolved
1.	Acute diverticulitis with lingering microperforation - improved  ·	dc planning today  ·	dc on ceftin 500mg PO BID and flagyl 500mg PO TID, both for 6 days  ·	reconsult prn
Repeat abdominal CT today
Sigmoid Diverticulitis with ruptured diverticula  1. NPO  2. cont IV antibiotics  3. plan for repeat scan in 3 days  4. will follow
sigmoid diverticulitis    1- cont abx  2- low fiber diet  3- clear for d/c from surgery standpoint, once medically cleared. please continue current diet and f/u with Dr Linton in the office in 1-2 weeks. [phone]
1.	Acute diverticulitis with lingering microperforation   2.	R/o 'ed bacteremia - likely a contaminant  ·	cont zosyn 3.375gm IV q8h D6  ·	remain on clears
73 y/o female with Sigmoid Diverticulitis      1. Keep on clears  2. Needs repeat CT scan with PO & IV contrast today  3. Continued observation  4. DVT PPx
73 y/o female with sigmoid diverticulitis and microperforation       1. Keep NPO as she is still pretty tender  2. Continue antibiotics  3. DVT PPx
Advance diet in AM  OOB
Complicated sigmoid diverticulitis    Hemodynamically stable with good recuperation    -OOB, IS  -Cont abx as per ID  -CLD  -GI/DVT prophylaxis
NPO  IV ATB  OOB  Observation
Patient is a 72 year-old female with PMH of DM, HLD who presented with chills and is admitted for sigmoid diverticulitis with localized perforation, thus far with stable serial abdominal exams, but still has tenderness.
diverticulitis with micro perf.   clinically benign abdomen.  remains afebrile      will discuss with Dr Linton and advise with further management recommendations  IVABx, npo for now  continue clears for now
Patient is a 72 year-old female with PMH of DM II, HLD who presented with chills and is admitted for sigmoid diverticulitis with localized perforation, thus, far with stable serial abdominal exams,  tolerating liquid diet
Patient is a 72 year-old female with PMH of DM, HLD who presented with chills and is admitted for sigmoid diverticulitis with localized perforation, tolerating regular low fiber diet, abdominal tenderness resolved
Patient is a 72 year-old female with PMH of DM, HLD who presented with chills and is admitted for sigmoid diverticulitis with localized perforation, thus far with stable serial abdominal exams.
Patient is a 72 year-old female with PMH of DM, HLD who presented with chills and is admitted for sigmoid diverticulitis with localized perforation, tolerating clear liquid diet, abdominal tenderness improved.
acute diverticulitis - perforation has resolved  bacteremia - likely a contaminant    plan - cont zosyn 3.375gms iv q8hrs  possible dc home on monday

## 2018-04-05 NOTE — PROGRESS NOTE ADULT - PROBLEM SELECTOR PROBLEM 2
DM (diabetes mellitus)

## 2018-04-05 NOTE — PROGRESS NOTE ADULT - PROBLEM SELECTOR PLAN 2
- HbA1c is 9.8, began lantus 10 units tonight   - continue humalog sliding scale  - patient has burning/tingling in hands/feet (longstanding) likely due to diabetic neuropathy- continue  gabapentin 100mg twice daily - HbA1c is 9.8, began lantus tonight   - continue humalog sliding scale  - patient has burning/tingling in hands/feet (longstanding) likely due to diabetic neuropathy- continue  gabapentin 100mg twice daily

## 2018-04-06 VITALS
DIASTOLIC BLOOD PRESSURE: 69 MMHG | TEMPERATURE: 98 F | OXYGEN SATURATION: 98 % | HEART RATE: 80 BPM | SYSTOLIC BLOOD PRESSURE: 110 MMHG | RESPIRATION RATE: 18 BRPM

## 2018-04-06 LAB
CULTURE RESULTS: SIGNIFICANT CHANGE UP
SPECIMEN SOURCE: SIGNIFICANT CHANGE UP

## 2018-04-06 RX ORDER — METRONIDAZOLE 500 MG
1 TABLET ORAL
Qty: 18 | Refills: 0
Start: 2018-04-06 | End: 2018-04-11

## 2018-04-06 RX ORDER — INSULIN GLARGINE 100 [IU]/ML
6 INJECTION, SOLUTION SUBCUTANEOUS
Qty: 30 | Refills: 0
Start: 2018-04-06 | End: 2018-05-05

## 2018-04-06 RX ORDER — ASPIRIN/CALCIUM CARB/MAGNESIUM 324 MG
1 TABLET ORAL
Qty: 0 | Refills: 0 | DISCHARGE
Start: 2018-04-06

## 2018-04-06 RX ORDER — GABAPENTIN 400 MG/1
1 CAPSULE ORAL
Qty: 60 | Refills: 0
Start: 2018-04-06 | End: 2018-05-05

## 2018-04-06 RX ORDER — CEFUROXIME AXETIL 250 MG
1 TABLET ORAL
Qty: 12 | Refills: 0
Start: 2018-04-06 | End: 2018-04-11

## 2018-04-06 RX ADMIN — GABAPENTIN 100 MILLIGRAM(S): 400 CAPSULE ORAL at 06:03

## 2018-04-06 RX ADMIN — PIPERACILLIN AND TAZOBACTAM 25 GRAM(S): 4; .5 INJECTION, POWDER, LYOPHILIZED, FOR SOLUTION INTRAVENOUS at 08:00

## 2018-04-06 RX ADMIN — Medication 1: at 08:00

## 2018-04-06 RX ADMIN — Medication 81 MILLIGRAM(S): at 11:16

## 2018-04-06 RX ADMIN — Medication 1: at 11:48

## 2018-04-06 RX ADMIN — PIPERACILLIN AND TAZOBACTAM 25 GRAM(S): 4; .5 INJECTION, POWDER, LYOPHILIZED, FOR SOLUTION INTRAVENOUS at 01:30

## 2018-04-06 RX ADMIN — ENOXAPARIN SODIUM 40 MILLIGRAM(S): 100 INJECTION SUBCUTANEOUS at 11:16

## 2018-08-01 ENCOUNTER — OUTPATIENT (OUTPATIENT)
Dept: OUTPATIENT SERVICES | Facility: HOSPITAL | Age: 73
LOS: 1 days | End: 2018-08-01
Payer: MEDICARE

## 2018-08-01 PROCEDURE — G9001: CPT

## 2018-08-18 ENCOUNTER — EMERGENCY (EMERGENCY)
Facility: HOSPITAL | Age: 73
LOS: 1 days | Discharge: HOME | End: 2018-08-18
Attending: EMERGENCY MEDICINE
Payer: MEDICARE

## 2018-08-18 VITALS
OXYGEN SATURATION: 97 % | RESPIRATION RATE: 18 BRPM | DIASTOLIC BLOOD PRESSURE: 78 MMHG | TEMPERATURE: 100 F | WEIGHT: 130.07 LBS | HEART RATE: 90 BPM | SYSTOLIC BLOOD PRESSURE: 125 MMHG | HEIGHT: 63 IN

## 2018-08-18 LAB
ALBUMIN SERPL ELPH-MCNC: 3.6 G/DL — SIGNIFICANT CHANGE UP (ref 3.5–5)
ALP SERPL-CCNC: 83 U/L — SIGNIFICANT CHANGE UP (ref 40–120)
ALT FLD-CCNC: 20 U/L DA — SIGNIFICANT CHANGE UP (ref 10–60)
ANION GAP SERPL CALC-SCNC: 8 MMOL/L — SIGNIFICANT CHANGE UP (ref 5–17)
APPEARANCE UR: CLEAR — SIGNIFICANT CHANGE UP
AST SERPL-CCNC: 11 U/L — SIGNIFICANT CHANGE UP (ref 10–40)
BASOPHILS # BLD AUTO: 0.1 K/UL — SIGNIFICANT CHANGE UP (ref 0–0.2)
BASOPHILS NFR BLD AUTO: 0.9 % — SIGNIFICANT CHANGE UP (ref 0–2)
BILIRUB SERPL-MCNC: 0.4 MG/DL — SIGNIFICANT CHANGE UP (ref 0.2–1.2)
BILIRUB UR-MCNC: NEGATIVE — SIGNIFICANT CHANGE UP
BUN SERPL-MCNC: 24 MG/DL — HIGH (ref 7–18)
CALCIUM SERPL-MCNC: 8.7 MG/DL — SIGNIFICANT CHANGE UP (ref 8.4–10.5)
CHLORIDE SERPL-SCNC: 103 MMOL/L — SIGNIFICANT CHANGE UP (ref 96–108)
CO2 SERPL-SCNC: 27 MMOL/L — SIGNIFICANT CHANGE UP (ref 22–31)
COLOR SPEC: YELLOW — SIGNIFICANT CHANGE UP
CREAT SERPL-MCNC: 0.73 MG/DL — SIGNIFICANT CHANGE UP (ref 0.5–1.3)
DIFF PNL FLD: ABNORMAL
EOSINOPHIL # BLD AUTO: 0.1 K/UL — SIGNIFICANT CHANGE UP (ref 0–0.5)
EOSINOPHIL NFR BLD AUTO: 0.7 % — SIGNIFICANT CHANGE UP (ref 0–6)
GLUCOSE SERPL-MCNC: 171 MG/DL — HIGH (ref 70–99)
GLUCOSE UR QL: 1000 MG/DL
HCT VFR BLD CALC: 35 % — SIGNIFICANT CHANGE UP (ref 34.5–45)
HGB BLD-MCNC: 11.3 G/DL — LOW (ref 11.5–15.5)
KETONES UR-MCNC: NEGATIVE — SIGNIFICANT CHANGE UP
LEUKOCYTE ESTERASE UR-ACNC: NEGATIVE — SIGNIFICANT CHANGE UP
LIDOCAIN IGE QN: 157 U/L — SIGNIFICANT CHANGE UP (ref 73–393)
LYMPHOCYTES # BLD AUTO: 1.6 K/UL — SIGNIFICANT CHANGE UP (ref 1–3.3)
LYMPHOCYTES # BLD AUTO: 19.6 % — SIGNIFICANT CHANGE UP (ref 13–44)
MCHC RBC-ENTMCNC: 27.7 PG — SIGNIFICANT CHANGE UP (ref 27–34)
MCHC RBC-ENTMCNC: 32.1 GM/DL — SIGNIFICANT CHANGE UP (ref 32–36)
MCV RBC AUTO: 86.2 FL — SIGNIFICANT CHANGE UP (ref 80–100)
MONOCYTES # BLD AUTO: 0.7 K/UL — SIGNIFICANT CHANGE UP (ref 0–0.9)
MONOCYTES NFR BLD AUTO: 8.7 % — SIGNIFICANT CHANGE UP (ref 2–14)
NEUTROPHILS # BLD AUTO: 5.7 K/UL — SIGNIFICANT CHANGE UP (ref 1.8–7.4)
NEUTROPHILS NFR BLD AUTO: 70.1 % — SIGNIFICANT CHANGE UP (ref 43–77)
NITRITE UR-MCNC: NEGATIVE — SIGNIFICANT CHANGE UP
PH UR: 7 — SIGNIFICANT CHANGE UP (ref 5–8)
PLATELET # BLD AUTO: 221 K/UL — SIGNIFICANT CHANGE UP (ref 150–400)
POTASSIUM SERPL-MCNC: 3.9 MMOL/L — SIGNIFICANT CHANGE UP (ref 3.5–5.3)
POTASSIUM SERPL-SCNC: 3.9 MMOL/L — SIGNIFICANT CHANGE UP (ref 3.5–5.3)
PROT SERPL-MCNC: 7.9 G/DL — SIGNIFICANT CHANGE UP (ref 6–8.3)
PROT UR-MCNC: NEGATIVE — SIGNIFICANT CHANGE UP
RBC # BLD: 4.06 M/UL — SIGNIFICANT CHANGE UP (ref 3.8–5.2)
RBC # FLD: 12.8 % — SIGNIFICANT CHANGE UP (ref 10.3–14.5)
SODIUM SERPL-SCNC: 138 MMOL/L — SIGNIFICANT CHANGE UP (ref 135–145)
SP GR SPEC: 1 — LOW (ref 1.01–1.02)
UROBILINOGEN FLD QL: NEGATIVE — SIGNIFICANT CHANGE UP
WBC # BLD: 8.2 K/UL — SIGNIFICANT CHANGE UP (ref 3.8–10.5)
WBC # FLD AUTO: 8.2 K/UL — SIGNIFICANT CHANGE UP (ref 3.8–10.5)

## 2018-08-18 PROCEDURE — 74177 CT ABD & PELVIS W/CONTRAST: CPT | Mod: 26

## 2018-08-18 PROCEDURE — 99284 EMERGENCY DEPT VISIT MOD MDM: CPT

## 2018-08-18 RX ORDER — ONDANSETRON 8 MG/1
4 TABLET, FILM COATED ORAL ONCE
Refills: 0 | Status: COMPLETED | OUTPATIENT
Start: 2018-08-18 | End: 2018-08-18

## 2018-08-18 RX ORDER — SODIUM CHLORIDE 9 MG/ML
1000 INJECTION INTRAMUSCULAR; INTRAVENOUS; SUBCUTANEOUS ONCE
Refills: 0 | Status: COMPLETED | OUTPATIENT
Start: 2018-08-18 | End: 2018-08-18

## 2018-08-18 RX ADMIN — ONDANSETRON 4 MILLIGRAM(S): 8 TABLET, FILM COATED ORAL at 21:34

## 2018-08-18 RX ADMIN — SODIUM CHLORIDE 1000 MILLILITER(S): 9 INJECTION INTRAMUSCULAR; INTRAVENOUS; SUBCUTANEOUS at 21:33

## 2018-08-18 NOTE — ED ADULT NURSE NOTE - NSIMPLEMENTINTERV_GEN_ALL_ED
Implemented All Universal Safety Interventions:  [place] to call system. Call bell, personal items and telephone within reach. Instruct patient to call for assistance. Room bathroom lighting operational. Non-slip footwear when patient is off stretcher. Physically safe environment: no spills, clutter or unnecessary equipment. Stretcher in lowest position, wheels locked, appropriate side rails in place.

## 2018-08-18 NOTE — ED PROVIDER NOTE - OBJECTIVE STATEMENT
71 y/o F pt with PMHx of DM and HLD presents to ED c/o diffused abd pain and back pain x 2 days. Pt also with nausea and vomiting x once per day. Pt denies h/o similar symptoms in the past. Patient denies fever, chills, diarrhea, or any other complaints. ALLERGIES: penicillin (rash).

## 2018-08-22 DIAGNOSIS — Z71.89 OTHER SPECIFIED COUNSELING: ICD-10-CM

## 2018-12-04 ENCOUNTER — EMERGENCY (EMERGENCY)
Facility: HOSPITAL | Age: 73
LOS: 1 days | Discharge: HOME | End: 2018-12-04
Attending: EMERGENCY MEDICINE
Payer: MEDICARE

## 2018-12-04 VITALS
SYSTOLIC BLOOD PRESSURE: 156 MMHG | TEMPERATURE: 98 F | DIASTOLIC BLOOD PRESSURE: 83 MMHG | RESPIRATION RATE: 16 BRPM | HEART RATE: 76 BPM | WEIGHT: 138.01 LBS | HEIGHT: 63 IN | OXYGEN SATURATION: 99 %

## 2018-12-04 PROCEDURE — 71046 X-RAY EXAM CHEST 2 VIEWS: CPT | Mod: 26

## 2018-12-04 PROCEDURE — 99284 EMERGENCY DEPT VISIT MOD MDM: CPT | Mod: 25

## 2018-12-04 PROCEDURE — 70486 CT MAXILLOFACIAL W/O DYE: CPT | Mod: 26

## 2018-12-04 PROCEDURE — 70450 CT HEAD/BRAIN W/O DYE: CPT | Mod: 26

## 2018-12-04 RX ORDER — TRAMADOL HYDROCHLORIDE 50 MG/1
50 TABLET ORAL ONCE
Refills: 0 | Status: DISCONTINUED | OUTPATIENT
Start: 2018-12-04 | End: 2018-12-04

## 2018-12-04 RX ADMIN — Medication 500 MILLIGRAM(S): at 07:04

## 2018-12-04 NOTE — ED PROVIDER NOTE - MEDICAL DECISION MAKING DETAILS
72yo F with hx DM presents with jaw and rib pain. Will obtain CXR and CT facial bones. Given naproxen for pain, will reassess.

## 2018-12-04 NOTE — ED PROVIDER NOTE - CPE EDP SKIN NORM
Patient was seen today as part of clinical research protocol for management of BALDERAS cirrhosis. Updated history and physical examination per protocol was performed as well as lab studies. Symptoms reported at this time include: fatigue and some dizziness with position change. She denies symptoms of GI bleeding. Patient is having <1 bowel movement daily with bid use of lactulose. Patient has underlying cirrhosis and is is in need of the following surveillance testing:  EGD, this has been ordered with Dr Leonidas Mendoza in the near future. US in 10/2017 was unremarkable. Will obtain AFP and iron panel and consider repeat administration of IV iron if appropriate. Patient to follow-up per protocol.     Gurdeep Kaufman PA-C  [facility] [address], [address] [place]  [address].  [phone]
normal...

## 2018-12-04 NOTE — ED ADULT NURSE NOTE - OBJECTIVE STATEMENT
pt is A&Ox3, ambulatory, able to make needs known and presents with C/O jaw and body pain from fall two days ago. PT denies LOC or any other injuries but reports worsening pain to jaw body

## 2018-12-04 NOTE — ED ADULT NURSE NOTE - ED STAT RN HANDOFF DETAILS
Received patient from JAIRO Negron discharged by ER MD. NO IV access to be removed , patient leaving ambulatory in no acute distress.

## 2018-12-04 NOTE — ED PROVIDER NOTE - PROGRESS NOTE DETAILS
CTs neg for acute traumatic injury  CXR unremarkable, discussed above with patient. Pt stable for discharge to f/u with PMD.

## 2018-12-04 NOTE — ED PROVIDER NOTE - ENMT, MLM
Airway patent, Nasal mucosa clear. Mouth with normal mucosa. Throat has no vesicles, no oropharyngeal exudates and uvula is midline.  ttp throughout mandible, no steppoffs or crepitus, no malocclusion noted, ecchymosis over L side of mandible/mentum area evident.

## 2018-12-04 NOTE — ED PROVIDER NOTE - RESPIRATORY, MLM
Breath sounds clear and equal bilaterally. Mild anterior chest wall ttp, no ecchymosis/abrasions/stepoffs or crepitus.

## 2018-12-04 NOTE — ED PROVIDER NOTE - OBJECTIVE STATEMENT
72yo F with hx DM presents with jaw and rib pain. Reports she feel 2 days ago while walking on the street, fell forward and was unable to break her fall since hand were in her pockets, struck her chin and front chest against the pavement. reports she was able to get up on her own and went home. Has been taking extra strength tylenol at home with no relief of pain. Reports her jaw hurts when she eats/chews.

## 2019-02-02 NOTE — PROGRESS NOTE ADULT - NSHPATTENDINGPLANDISCUSS_GEN_ALL_CORE
see above
Performed under sterile technique
see above
see above
see above.
see above

## 2020-04-13 NOTE — PATIENT PROFILE ADULT. - SPIRITUAL CULTURAL, RELIGIOUS PRACTICES/VALUES, PROFILE
Neurology
Procedure   Procedures    Endometrial Biopsy Procedure Note    Pre-operative Diagnosis: Menorrhagia    Post-operative Diagnosis: same    Indications: abnormal uterine bleeding    Procedure Details    Urine pregnancy test was done today and result was negative.  The risks (including infection, bleeding, pain, and uterine perforation) and benefits of the procedure were explained to the patient and Verbal informed consent was obtained.  Antibiotic prophylaxis against endocarditis was not indicated.     The patient was placed in the dorsal lithotomy position.  Bimanual exam showed the uterus to be in the anteroflexed position.  A Graves' speculum inserted in the vagina, and the cervix prepped with povidone iodine.  Endocervical curettage with a Kevorkian curette was not performed.     A sharp tenaculum was applied to the posterior lip of the cervix for stabilization.  A sterile uterine sound was used to sound the uterus to a depth of 7cm.  A Pipelle endometrial aspirator was used to sample the endometrium.  Sample was sent for pathologic examination.    Condition:  Stable    Complications:  None    Plan:    The patient was advised to call for any fever or for prolonged or severe pain or bleeding. She was advised to use OTC analgesics as needed for mild to moderate pain. She was advised to avoid vaginal intercourse for 48 hours or until the bleeding has completely stopped.    Attending Physician Documentation:  I was present for or participated in the entire procedure, including opening and closing.   
Jehovah's witness

## 2020-10-29 NOTE — ED ADULT NURSE NOTE - FALL HARM RISK TYPE OF ASSESSMENT
Daily Assessment Interpolation Flap Text: A decision was made to reconstruct the defect utilizing an interpolation axial flap and a staged reconstruction.  A telfa template was made of the defect.  This telfa template was then used to outline the interpolation flap.  The donor area for the pedicle flap was then injected with anesthesia.  The flap was excised through the skin and subcutaneous tissue down to the layer of the underlying musculature.  The interpolation flap was carefully excised within this deep plane to maintain its blood supply.  The edges of the donor site were undermined.   The donor site was closed in a primary fashion.  The pedicle was then rotated into position and sutured.  Once the tube was sutured into place, adequate blood supply was confirmed with blanching and refill.  The pedicle was then wrapped with xeroform gauze and dressed appropriately with a telfa and gauze bandage to ensure continued blood supply and protect the attached pedicle.

## 2021-07-06 NOTE — ED PROVIDER NOTE - CPE EDP HEME LYMPH NORM
Preop  Surgery: RIGHT TOTAL KNEE ARTHROPLASTY CEMENTED  Dos: 08/04/21  Surgeon: Dr. Robison  Dx:   Osteoarthritis of right knee M17.11    Routed to Arabella OTT to schedule.      normal...

## 2022-12-15 NOTE — ED PROVIDER NOTE - NS ED ATTENDING STATEMENT MOD
Go for blood tests as directed. Your doctor will do lab tests at regular visits to monitor the effects of this medicine. Please follow up with your doctor and keep your health care provider appointments.
Attending Only

## 2024-04-24 ENCOUNTER — APPOINTMENT (OUTPATIENT)
Dept: CARDIOLOGY | Facility: CLINIC | Age: 79
End: 2024-04-24

## 2024-05-09 ENCOUNTER — APPOINTMENT (OUTPATIENT)
Dept: UROLOGY | Facility: CLINIC | Age: 79
End: 2024-05-09
Payer: MEDICARE

## 2024-05-09 VITALS
DIASTOLIC BLOOD PRESSURE: 71 MMHG | HEART RATE: 86 BPM | BODY MASS INDEX: 22.02 KG/M2 | TEMPERATURE: 97.6 F | WEIGHT: 129 LBS | HEIGHT: 64 IN | OXYGEN SATURATION: 97 % | SYSTOLIC BLOOD PRESSURE: 112 MMHG

## 2024-05-09 DIAGNOSIS — R31.21 ASYMPTOMATIC MICROSCOPIC HEMATURIA: ICD-10-CM

## 2024-05-09 DIAGNOSIS — Z83.3 FAMILY HISTORY OF DIABETES MELLITUS: ICD-10-CM

## 2024-05-09 PROCEDURE — 99203 OFFICE O/P NEW LOW 30 MIN: CPT | Mod: 25

## 2024-05-09 PROCEDURE — 51798 US URINE CAPACITY MEASURE: CPT

## 2024-05-10 ENCOUNTER — APPOINTMENT (OUTPATIENT)
Dept: UROLOGY | Facility: CLINIC | Age: 79
End: 2024-05-10

## 2024-05-17 ENCOUNTER — APPOINTMENT (OUTPATIENT)
Dept: UROLOGY | Facility: CLINIC | Age: 79
End: 2024-05-17
Payer: MEDICARE

## 2024-05-17 VITALS
HEIGHT: 64 IN | SYSTOLIC BLOOD PRESSURE: 118 MMHG | TEMPERATURE: 95.4 F | DIASTOLIC BLOOD PRESSURE: 74 MMHG | WEIGHT: 128 LBS | BODY MASS INDEX: 21.85 KG/M2 | HEART RATE: 80 BPM | OXYGEN SATURATION: 99 %

## 2024-05-17 DIAGNOSIS — N13.30 UNSPECIFIED HYDRONEPHROSIS: ICD-10-CM

## 2024-05-17 DIAGNOSIS — R82.71 BACTERIURIA: ICD-10-CM

## 2024-05-17 PROBLEM — R31.21 ASYMPTOMATIC MICROSCOPIC HEMATURIA: Status: ACTIVE | Noted: 2024-05-17

## 2024-05-17 PROCEDURE — 99214 OFFICE O/P EST MOD 30 MIN: CPT

## 2024-05-17 PROCEDURE — 76775 US EXAM ABDO BACK WALL LIM: CPT

## 2024-05-17 NOTE — HISTORY OF PRESENT ILLNESS
[FreeTextEntry1] : 05/09/2024 cc: UTI 78 year old female presents for UTI. Pt reports she came here due to her PCP telling her she has an infection- e coli and was given abx, She states her urination flow was slow in the morning and is frequent at night. Pt reports she has no urinary sx. Pt states she had a U/S four years ago but never got the results due to [place] the [place]. She states when taking abx she noticed no improvement.  PMHx: DM, UTI (4 years ago) no h/o Kidney stones

## 2024-05-17 NOTE — END OF VISIT
[FreeTextEntry4] : This note was written by Lexie Christine on 05/17/2024 actively solely Vance Pederson M.D. I, Lexie Christine, am scribing for and in the presence of Vance Pederson M.D. in the following sections HISTORY OF PRESENT ILLNESS, PAST MEDICAL/FAMILY/SOCIAL HISTORY; REVIEW OF SYSTEMS; VITAL SIGNS; PHYSICAL EXAM; ASSESSMENT/PLAN.   All medical record entries made by this scribe at my, Vance Pederson M.D. direction and personally dictated by me on 05/17/2024. I personally performed the services described in the documentation, reviewed the documentation recorded by the scribe in my presence, and it accurately and completely records my words and actions.

## 2024-05-17 NOTE — HISTORY OF PRESENT ILLNESS
[FreeTextEntry1] : 05/09/2024 cc: UTI 78 year old female presents for UTI. Pt reports she came here due to her PCP telling her she has an infection- e coli and was given abx, She states her urination flow was slow in the morning and is frequent at night. Pt reports she has no urinary sx. Pt states she had a U/S four years ago but never got the results due to traveling outside the country. She states when taking abx she noticed no improvement.  PMHx: DM, UTI (4 years ago) no h/o Kidney stones  05/17/2024 cc review U/S results  78 year old female presents to review U/S results. Pt denies experiencing any sx.  05/17/24 U/S Findings: Right kidney demonstrates mild renal pelvic fullness vs. mild hydronephrosis. Left kidney was unremarkable. Both kidneys are normal in size and echogenicity without stones or solid masses visualized

## 2024-05-17 NOTE — END OF VISIT
[FreeTextEntry4] : This note was written by Adrian Dumont on 05/09/2024 actively solely Vance Pederson M.D. I, Adrian Dumont, am scribing for and in the presence of Vance Pederson M.D. in the following sections HISTORY OF PRESENT ILLNESS, PAST MEDICAL/FAMILY/SOCIAL HISTORY; REVIEW OF SYSTEMS; VITAL SIGNS; PHYSICAL EXAM; ASSESSMENT/PLAN.  All medical record entries made by this scribe at my, Vance Peedrson M.D. direction and personally dictated by me on 05/09/2024. I personally performed the services described in the documentation, reviewed the documentation recorded by the scribe in my presence, and it accurately and completely records my words and actions.

## 2024-05-17 NOTE — ASSESSMENT
[FreeTextEntry1] : 78 year old female to review U/S results. Reviewed U/S images with pt. Right kidney demonstrates mild renal pelvic fullness vs. mild hydronephrosis. Left kidney was unremarkable. Both kidneys are normal in size and echogenicity without stones or solid masses visualized.  Informed pt that she doesn't have any stones but she may have hydronephrosis of her left kidney. Advised pt to have a CT scan. If normal then nothing will be done.  Abdominal and Pelvic CT ordered today.

## 2024-05-17 NOTE — ASSESSMENT
[FreeTextEntry1] : 78 year old female with UTI.   Informed pt that if she is not experiencing any sx then the UTI does not need to be treated.  PVR done today. Residual 0 cc. will get renal sono to assure no source for recurrent infection

## 2024-05-28 PROBLEM — R82.71 BACTERIURIA, ASYMPTOMATIC: Status: ACTIVE | Noted: 2024-05-28 | Resolved: 2024-06-27

## 2024-06-02 ENCOUNTER — EMERGENCY (EMERGENCY)
Facility: HOSPITAL | Age: 79
LOS: 1 days | Discharge: LEFT BEFORE DISCHARGE | End: 2024-06-02
Attending: EMERGENCY MEDICINE
Payer: MEDICARE

## 2024-06-02 VITALS
HEIGHT: 64 IN | DIASTOLIC BLOOD PRESSURE: 76 MMHG | OXYGEN SATURATION: 96 % | TEMPERATURE: 99 F | SYSTOLIC BLOOD PRESSURE: 152 MMHG | RESPIRATION RATE: 16 BRPM | WEIGHT: 128.97 LBS | HEART RATE: 76 BPM

## 2024-06-02 PROCEDURE — L9991: CPT

## 2024-06-02 RX ORDER — SODIUM CHLORIDE 9 MG/ML
1000 INJECTION INTRAMUSCULAR; INTRAVENOUS; SUBCUTANEOUS
Refills: 0 | Status: DISCONTINUED | OUTPATIENT
Start: 2024-06-02 | End: 2024-06-06

## 2024-06-03 ENCOUNTER — APPOINTMENT (OUTPATIENT)
Dept: CT IMAGING | Facility: CLINIC | Age: 79
End: 2024-06-03

## 2024-06-05 ENCOUNTER — EMERGENCY (EMERGENCY)
Facility: HOSPITAL | Age: 79
LOS: 1 days | Discharge: HOME | End: 2024-06-05
Attending: EMERGENCY MEDICINE
Payer: MEDICARE

## 2024-06-05 VITALS
DIASTOLIC BLOOD PRESSURE: 68 MMHG | HEART RATE: 72 BPM | RESPIRATION RATE: 18 BRPM | OXYGEN SATURATION: 98 % | SYSTOLIC BLOOD PRESSURE: 121 MMHG | TEMPERATURE: 98 F

## 2024-06-05 VITALS
SYSTOLIC BLOOD PRESSURE: 118 MMHG | WEIGHT: 128.09 LBS | HEIGHT: 64 IN | OXYGEN SATURATION: 97 % | DIASTOLIC BLOOD PRESSURE: 74 MMHG | RESPIRATION RATE: 18 BRPM | TEMPERATURE: 98 F | HEART RATE: 68 BPM

## 2024-06-05 DIAGNOSIS — Z90.710 ACQUIRED ABSENCE OF BOTH CERVIX AND UTERUS: Chronic | ICD-10-CM

## 2024-06-05 LAB
ALBUMIN SERPL ELPH-MCNC: 3.6 G/DL — SIGNIFICANT CHANGE UP (ref 3.5–5)
ALP SERPL-CCNC: 66 U/L — SIGNIFICANT CHANGE UP (ref 40–120)
ALT FLD-CCNC: 24 U/L DA — SIGNIFICANT CHANGE UP (ref 10–60)
ANION GAP SERPL CALC-SCNC: 6 MMOL/L — SIGNIFICANT CHANGE UP (ref 5–17)
AST SERPL-CCNC: 13 U/L — SIGNIFICANT CHANGE UP (ref 10–40)
BASOPHILS # BLD AUTO: 0.03 K/UL — SIGNIFICANT CHANGE UP (ref 0–0.2)
BASOPHILS NFR BLD AUTO: 0.6 % — SIGNIFICANT CHANGE UP (ref 0–2)
BILIRUB SERPL-MCNC: 0.6 MG/DL — SIGNIFICANT CHANGE UP (ref 0.2–1.2)
BLD GP AB SCN SERPL QL: SIGNIFICANT CHANGE UP
BUN SERPL-MCNC: 7 MG/DL — SIGNIFICANT CHANGE UP (ref 7–18)
CALCIUM SERPL-MCNC: 8.9 MG/DL — SIGNIFICANT CHANGE UP (ref 8.4–10.5)
CHLORIDE SERPL-SCNC: 107 MMOL/L — SIGNIFICANT CHANGE UP (ref 96–108)
CO2 SERPL-SCNC: 27 MMOL/L — SIGNIFICANT CHANGE UP (ref 22–31)
CREAT SERPL-MCNC: 0.53 MG/DL — SIGNIFICANT CHANGE UP (ref 0.5–1.3)
EGFR: 94 ML/MIN/1.73M2 — SIGNIFICANT CHANGE UP
EOSINOPHIL # BLD AUTO: 0.04 K/UL — SIGNIFICANT CHANGE UP (ref 0–0.5)
EOSINOPHIL NFR BLD AUTO: 0.8 % — SIGNIFICANT CHANGE UP (ref 0–6)
GLUCOSE SERPL-MCNC: 163 MG/DL — HIGH (ref 70–99)
HCT VFR BLD CALC: 32 % — LOW (ref 34.5–45)
HCT VFR BLD CALC: 34 % — LOW (ref 34.5–45)
HGB BLD-MCNC: 10.7 G/DL — LOW (ref 11.5–15.5)
HGB BLD-MCNC: 11.3 G/DL — LOW (ref 11.5–15.5)
HIV 1 & 2 AB SERPL IA.RAPID: SIGNIFICANT CHANGE UP
IMM GRANULOCYTES NFR BLD AUTO: 0.2 % — SIGNIFICANT CHANGE UP (ref 0–0.9)
INR BLD: 1.01 RATIO — SIGNIFICANT CHANGE UP (ref 0.85–1.18)
LIDOCAIN IGE QN: 36 U/L — SIGNIFICANT CHANGE UP (ref 13–75)
LYMPHOCYTES # BLD AUTO: 1.55 K/UL — SIGNIFICANT CHANGE UP (ref 1–3.3)
LYMPHOCYTES # BLD AUTO: 29.9 % — SIGNIFICANT CHANGE UP (ref 13–44)
MAGNESIUM SERPL-MCNC: 2 MG/DL — SIGNIFICANT CHANGE UP (ref 1.6–2.6)
MCHC RBC-ENTMCNC: 28.3 PG — SIGNIFICANT CHANGE UP (ref 27–34)
MCHC RBC-ENTMCNC: 28.4 PG — SIGNIFICANT CHANGE UP (ref 27–34)
MCHC RBC-ENTMCNC: 33.2 GM/DL — SIGNIFICANT CHANGE UP (ref 32–36)
MCHC RBC-ENTMCNC: 33.4 GM/DL — SIGNIFICANT CHANGE UP (ref 32–36)
MCV RBC AUTO: 84.9 FL — SIGNIFICANT CHANGE UP (ref 80–100)
MCV RBC AUTO: 85.2 FL — SIGNIFICANT CHANGE UP (ref 80–100)
MONOCYTES # BLD AUTO: 0.37 K/UL — SIGNIFICANT CHANGE UP (ref 0–0.9)
MONOCYTES NFR BLD AUTO: 7.1 % — SIGNIFICANT CHANGE UP (ref 2–14)
NEUTROPHILS # BLD AUTO: 3.18 K/UL — SIGNIFICANT CHANGE UP (ref 1.8–7.4)
NEUTROPHILS NFR BLD AUTO: 61.4 % — SIGNIFICANT CHANGE UP (ref 43–77)
NRBC # BLD: 0 /100 WBCS — SIGNIFICANT CHANGE UP (ref 0–0)
NRBC # BLD: 0 /100 WBCS — SIGNIFICANT CHANGE UP (ref 0–0)
PHOSPHATE SERPL-MCNC: 3 MG/DL — SIGNIFICANT CHANGE UP (ref 2.5–4.5)
PLATELET # BLD AUTO: 257 K/UL — SIGNIFICANT CHANGE UP (ref 150–400)
PLATELET # BLD AUTO: 269 K/UL — SIGNIFICANT CHANGE UP (ref 150–400)
POTASSIUM SERPL-MCNC: 3.8 MMOL/L — SIGNIFICANT CHANGE UP (ref 3.5–5.3)
POTASSIUM SERPL-SCNC: 3.8 MMOL/L — SIGNIFICANT CHANGE UP (ref 3.5–5.3)
PROT SERPL-MCNC: 7.1 G/DL — SIGNIFICANT CHANGE UP (ref 6–8.3)
PROTHROM AB SERPL-ACNC: 11.5 SEC — SIGNIFICANT CHANGE UP (ref 9.5–13)
RBC # BLD: 3.77 M/UL — LOW (ref 3.8–5.2)
RBC # BLD: 3.99 M/UL — SIGNIFICANT CHANGE UP (ref 3.8–5.2)
RBC # FLD: 13.6 % — SIGNIFICANT CHANGE UP (ref 10.3–14.5)
RBC # FLD: 13.7 % — SIGNIFICANT CHANGE UP (ref 10.3–14.5)
SODIUM SERPL-SCNC: 140 MMOL/L — SIGNIFICANT CHANGE UP (ref 135–145)
WBC # BLD: 5.18 K/UL — SIGNIFICANT CHANGE UP (ref 3.8–10.5)
WBC # BLD: 6.47 K/UL — SIGNIFICANT CHANGE UP (ref 3.8–10.5)
WBC # FLD AUTO: 5.18 K/UL — SIGNIFICANT CHANGE UP (ref 3.8–10.5)
WBC # FLD AUTO: 6.47 K/UL — SIGNIFICANT CHANGE UP (ref 3.8–10.5)

## 2024-06-05 PROCEDURE — 83690 ASSAY OF LIPASE: CPT

## 2024-06-05 PROCEDURE — 86803 HEPATITIS C AB TEST: CPT

## 2024-06-05 PROCEDURE — 99284 EMERGENCY DEPT VISIT MOD MDM: CPT | Mod: 25

## 2024-06-05 PROCEDURE — 86703 HIV-1/HIV-2 1 RESULT ANTBDY: CPT

## 2024-06-05 PROCEDURE — 85027 COMPLETE CBC AUTOMATED: CPT

## 2024-06-05 PROCEDURE — 36415 COLL VENOUS BLD VENIPUNCTURE: CPT

## 2024-06-05 PROCEDURE — 86850 RBC ANTIBODY SCREEN: CPT

## 2024-06-05 PROCEDURE — 74177 CT ABD & PELVIS W/CONTRAST: CPT | Mod: 26,MC

## 2024-06-05 PROCEDURE — 85025 COMPLETE CBC W/AUTO DIFF WBC: CPT

## 2024-06-05 PROCEDURE — 84100 ASSAY OF PHOSPHORUS: CPT

## 2024-06-05 PROCEDURE — 85610 PROTHROMBIN TIME: CPT

## 2024-06-05 PROCEDURE — 83735 ASSAY OF MAGNESIUM: CPT

## 2024-06-05 PROCEDURE — 99285 EMERGENCY DEPT VISIT HI MDM: CPT

## 2024-06-05 PROCEDURE — 74177 CT ABD & PELVIS W/CONTRAST: CPT | Mod: MC

## 2024-06-05 PROCEDURE — 86900 BLOOD TYPING SEROLOGIC ABO: CPT

## 2024-06-05 PROCEDURE — 80053 COMPREHEN METABOLIC PANEL: CPT

## 2024-06-05 PROCEDURE — 86901 BLOOD TYPING SEROLOGIC RH(D): CPT

## 2024-06-05 RX ORDER — SODIUM CHLORIDE 9 MG/ML
2000 INJECTION INTRAMUSCULAR; INTRAVENOUS; SUBCUTANEOUS ONCE
Refills: 0 | Status: COMPLETED | OUTPATIENT
Start: 2024-06-05 | End: 2024-06-05

## 2024-06-05 RX ORDER — LOPERAMIDE HCL 2 MG
4 TABLET ORAL ONCE
Refills: 0 | Status: COMPLETED | OUTPATIENT
Start: 2024-06-05 | End: 2024-06-05

## 2024-06-05 RX ADMIN — SODIUM CHLORIDE 2000 MILLILITER(S): 9 INJECTION INTRAMUSCULAR; INTRAVENOUS; SUBCUTANEOUS at 09:45

## 2024-06-05 RX ADMIN — Medication 4 MILLIGRAM(S): at 13:20

## 2024-06-05 NOTE — ED PROVIDER NOTE - WET READ LAUNCH FT
Physical Exam


Time Seen by Provider:  15:23





Data


Data


Last Documented VS





Vital Signs








  Date Time  Temp Pulse Resp B/P (MAP) Pulse Ox O2 Delivery O2 Flow Rate FiO2


 


8/30/17 13:44        


 


8/30/17 07:30  66 18  98 Room Air  


 


8/29/17 21:05 98.0       








Orders





 Orders


Complete Blood Count With Diff (8/29/17 19:17)


Basic Metabolic Panel (Bmp) (8/29/17 19:17)


Urinalysis - C+S If Indicated (8/29/17 19:17)


Psych Screen (8/29/17 19:17)


Drug Screen, Random Urine (8/29/17 19:17)


Alcohol (Ethanol) (8/29/17 19:17)


Urine Culture (8/29/17 21:26)


Nitrofurantoin Monohyd Macrocr (Macrobid (8/29/17 23:15)


Zolpidem (Ambien) (8/30/17 00:45)


Dextrose 10% Inj (D10w Inj) (8/30/17 01:15)


Blood Glucose (8/30/17 02:00)


Diet Regular Basic (8/30/17 Dinner)





Labs





Laboratory Tests








Test


  8/29/17


21:26 8/30/17


00:00


 


Urine Color YELLOW  


 


Urine Turbidity HAZY  


 


Urine pH 6.0  


 


Urine Specific Gravity 1.023  


 


Urine Protein NEG mg/dL  


 


Urine Glucose (UA) NEG mg/dL  


 


Urine Ketones NEG mg/dL  


 


Urine Occult Blood TRACE  


 


Urine Nitrite POS  


 


Urine Bilirubin NEG  


 


Urine Urobilinogen


  LESS THAN 2.0


MG/DL 


 


 


Urine Leukocyte Esterase MOD  


 


Urine RBC


  LESS THAN 1


/hpf 


 


 


Urine WBC 4 /hpf  


 


Urine Squamous Epithelial


Cells 1 /hpf 


  


 


 


Urine Amorphous Sediment RARE  


 


Urine Bacteria MANY /hpf  


 


Microscopic Urinalysis Comment


  CULTURE


INDICATED 


 


 


Urine Opiates Screen NEG  


 


Urine Barbiturates Screen POS  


 


Urine Amphetamines Screen NEG  


 


Urine Benzodiazepines Screen POS  


 


Urine Cocaine Screen NEG  


 


Urine Cannabinoids Screen NEG  


 


White Blood Count  3.7 TH/MM3 


 


Red Blood Count  3.03 MIL/MM3 


 


Hemoglobin  8.4 GM/DL 


 


Hematocrit  25.8 % 


 


Mean Corpuscular Volume  85.0 FL 


 


Mean Corpuscular Hemoglobin  27.7 PG 


 


Mean Corpuscular Hemoglobin


Concent 


  32.6 % 


 


 


Red Cell Distribution Width  16.4 % 


 


Platelet Count  125 TH/MM3 


 


Mean Platelet Volume  10.2 FL 


 


Neutrophils (%) (Auto)  45.9 % 


 


Lymphocytes (%) (Auto)  41.7 % 


 


Monocytes (%) (Auto)  10.4 % 


 


Eosinophils (%) (Auto)  1.7 % 


 


Basophils (%) (Auto)  0.3 % 


 


Neutrophils # (Auto)  1.7 TH/MM3 


 


Lymphocytes # (Auto)  1.6 TH/MM3 


 


Monocytes # (Auto)  0.4 TH/MM3 


 


Eosinophils # (Auto)  0.1 TH/MM3 


 


Basophils # (Auto)  0.0 TH/MM3 


 


CBC Comment  DIFF FINAL 


 


Differential Comment   


 


Blood Urea Nitrogen  23 MG/DL 


 


Creatinine  0.71 MG/DL 


 


Random Glucose  76 MG/DL 


 


Calcium Level  8.3 MG/DL 


 


Sodium Level  141 MEQ/L 


 


Potassium Level  4.3 MEQ/L 


 


Chloride Level  107 MEQ/L 


 


Carbon Dioxide Level  27.4 MEQ/L 


 


Anion Gap  7 MEQ/L 


 


Estimat Glomerular Filtration


Rate 


  85 ML/MIN 


 


 


Ethyl Alcohol Level


  


  LESS THAN 3


MG/DL











MDM


Medical Record Reviewed:  Yes


Supervised Visit with DELLA:  No


Narrative Course


Patient was seen and evaluated by psychiatry. Cruz Act has been lifted. Pt has 

no further medical needs. She will be given prescription for macrobid for UTI.


She will be discharged at this time.


Diagnosis





 Primary Impression:  


 Adjustment reaction


 Qualified Codes:  F43.23 - Adjustment disorder with mixed anxiety and 

depressed mood


 Additional Impression:  


 UTI (urinary tract infection)


 Qualified Codes:  N39.0 - Urinary tract infection, site not specified; R31.9 - 

Hematuria, unspecified


Referrals:  


Primary Care Physician





Psychiatrist


Patient Instructions:  General Instructions





***Additional Instruction:  


Follow up with your primary care provider


Return to ED with acute worsening of symptoms


***Med/Other Pt SpecificInfo:  Prescription(s) given


Scripts


Nitrofurantoin Monohydrate Macrocrystals (Macrobid) 100 Mg Cap


100 MG PO BID for Infection for 7 Days, CAP 0 Refills


   Prov: Adenike Dobson         8/30/17


Disposition:  01 DISCHARGE HOME


Condition:  Stable











Adenike Dobson Aug 30, 2017 15:26 There are no Wet Read(s) to document.

## 2024-06-05 NOTE — ED PROVIDER NOTE - NSFOLLOWUPCLINICS_GEN_ALL_ED_FT
[facility]  Gastroenterology  [address] [place] [address]  Phone: [phone]  Fax: [phone]  Follow Up Time: 4-6 Days    [facility]  Internal Medicine  [address] [place] [address]  Phone: [phone]  Fax: [phone]  Follow Up Time: 1-3 Days

## 2024-06-05 NOTE — ED PROVIDER NOTE - NS ED ROS FT
Constitutional: (-) fever (-) chills  HENT: (-) congestion (-) rhinorrhea (-) sore throat  Eyes: (-) pain (-) redness  Respiratory: (-) cough (-) shortness of breath (-) wheezing (-) stridor  Cardiovascular: (-) chest pain (-) palpitations (-) leg swelling  Gastrointestinal: (-) abdominal pain (-) blood in stool (no melena/hematochezia) (+) diarrhea (-) vomiting  Genitourinary: (-) dysuria (-) hematuria  Musculoskeletal: (-) gait problem (-) joint swelling (-) myalgias  Skin: (-) color change (-) rash  Neurological: (-) weakness (-) numbness (-) headaches  Psychiatric/Behavioral: (-) confusion

## 2024-06-05 NOTE — ED PROVIDER NOTE - PROGRESS NOTE DETAILS
Results reviewed.  Pt reports feeling better.  Tolerating PO intake.  No abd tender on reassessment.  Patient advised regarding symptomatic/supportive care, importance of outpatient follow up, and symptoms to prompt ED return.

## 2024-06-05 NOTE — ED PROVIDER NOTE - CLINICAL SUMMARY MEDICAL DECISION MAKING FREE TEXT BOX
78 with hx of DM, HTN, HLD, & prior hysterectomy.   Pt presenting to the ED reporting 4 days of multiple episodes of diarrhea.  No recent travel, hospitalization, antibiotics, or sick contacts.  Patient reports taking Pepto-Bismol for her symptoms and having dark stools afterwards.  Patient told PMD about dark stools and sent in for evaluation.    Vitals stable.  Nontoxic appearing, n/v intact.  Airway intact, no respiratory distress, no hypoxia.  No abdominal or CVA tenderness.  No bleeding on MELANIE.  Brown stool on MELANIE.    Plan to obtain:  -Labs, CT (r/o bowel obstruction, intra-abdominal infection, appendicitis, diverticulitis, colitis, abscess, or perforation), IV fluids, analgesia/antiemetics needed, observe/reassess    ED Course:  Lab values demonstrate no acute/emergent pathology.  My independent interpretation of the EKG: Sinus @ [], normal axis, normal intervals, normal ST/T  My independent interpretation of XR: [No consolidation/effusion/pntx]    [***]    [Patient advised regarding need for close outpatient follow up.  Patient stable for further care in outpatient setting. No indication for inpatient admission at this time. Patient advised regarding symptomatic & supportive care and symptoms to prompt ED return. Strict return precautions provided.]    [Patient requires inpatient admission for further care & stabilization. Care signed out to inpatient team.] 78 with hx of DM, HTN, HLD, & prior hysterectomy.   Pt presenting to the ED reporting 4 days of multiple episodes of diarrhea.  No recent travel, hospitalization, antibiotics, or sick contacts.  Patient reports taking Pepto-Bismol for her symptoms and having dark stools afterwards.  Patient told PMD about dark stools and sent in for evaluation.    Vitals stable.  Nontoxic appearing, n/v intact.  Airway intact, no respiratory distress, no hypoxia.  No abdominal or CVA tenderness.  No bleeding on MELANIE.  Brown stool on MELANIE.    Plan to obtain:  -Labs, CT (r/o bowel obstruction, intra-abdominal infection, appendicitis, diverticulitis, colitis, abscess, or perforation), IV fluids, analgesia/antiemetics needed, observe/reassess    ED Course:  Lab values demonstrate no acute/emergent pathology. 2nd CBC after 2L IV fluids w no significant drop  CT showing no acute pathology but enlarged bladder. Patient urinated after and post-void 38mL on bladder scan  Patient tolerating PO intake in ED. No abd tender on reassessment.  Patient advised regarding need for close outpatient follow up.  Patient stable for further care in outpatient setting. No indication for inpatient admission at this time. Patient advised regarding symptomatic & supportive care and symptoms to prompt ED return. Strict return precautions provided.

## 2024-06-05 NOTE — ED PROVIDER NOTE - PATIENT PORTAL LINK FT
You can access the FollowMyHealth Patient Portal offered by [facility] by registering at the following website: [email or web address]. By joining [employer]’s FollowMyHealth portal, you will also be able to view your health information using other applications (apps) compatible with our system.

## 2024-06-05 NOTE — ED ADULT NURSE NOTE - OBJECTIVE STATEMENT
pt presents from home co bloody stools + diarrhea since Sunday but now has subsided. Patient endorses her stools being black and now endorsing constipation since yesterday. At baseline pt is AAOx3, speaking in clear and complete sentences. Denies CP/SOB/weakness

## 2024-06-05 NOTE — ED ADULT TRIAGE NOTE - CHIEF COMPLAINT QUOTE
black stools with diarrhea x 3 days, denies abdominal pain/ vomiting sent by PMD for black stools with diarrhea x 3 days, denies abdominal pain/ vomiting

## 2024-06-05 NOTE — ED PROVIDER NOTE - NSFOLLOWUPINSTRUCTIONS_ED_ALL_ED_FT
Please follow up with your PMD or Medicine Clinic in 2-3 days.  Follow up with GI in 1 week.  Return to the ER for worsening or concerning symptoms.  Drink plenty of fluids or an oral rehydration solution like Pedialyte, Gatorade, or Powerade.  Keep your diet simple until symptoms improve. Introduce foods as tolerated such as bread, toast, plain rice, boiled potatoes, boiled chicken, bananas, apple sauce, etc. Avoid dairy, spicy, greasy, or fatty foods. Avoid alcohol or tobacco.  Take Loperamide (Imodium) 4mg followed by 2mg after each loose stool for no more than 48 hours. Maximum 16mg/day.     - - - - - - - - - - - - -  Diarrhea, Adult  Diarrhea is frequent loose and sometimes watery bowel movements. Diarrhea can make you feel weak and cause you to become dehydrated. Dehydration is a condition in which there is not enough water or other fluids in the body. Dehydration can make you tired and thirsty, cause you to have a dry mouth, and decrease how often you urinate.    Diarrhea typically lasts 2–3 days. However, it can last longer if it is a sign of something more serious. It is important to treat your diarrhea as told by your health care provider.    Follow these instructions at home:  Eating and drinking    A bottle of clear fruit juice and glass of water.   Bread, rice, and cereal from the grain group.  Follow these recommendations as told by your health care provider:  Take an oral rehydration solution (ORS). This is an over-the-counter medicine that helps return your body to its normal balance of nutrients and water. It is found at pharmacies and retail stores.  Drink enough fluid to keep your urine pale yellow.  Drink fluids such as water, diluted fruit juice, and low-calorie sports drinks. You can drink milk also, if desired. Sucking on ice chips is another way to get fluids.  Avoid drinking fluids that contain a lot of sugar or caffeine, such as soda, energy drinks, and regular sports drinks.  Avoid alcohol.  Eat bland, easy-to-digest foods in small amounts as you are able. These foods include bananas, applesauce, rice, lean meats, toast, and crackers.  Avoid spicy or fatty foods.  Medicines    Take over-the-counter and prescription medicines only as told by your health care provider.  If you were prescribed antibiotics, take them as told by your health care provider. Do not stop using the antibiotic even if you start to feel better.  General instructions    Washing hands with soap and water.  Wash your hands often using soap and water for at least 20 seconds. If soap and water are not available, use hand . Others in the household should wash their hands as well. Hands should be washed:  After using the toilet or changing a diaper.  Before preparing, cooking, or serving food.  While caring for a sick person or while visiting someone in a hospital.  Rest at home while you recover.  Take a warm bath to relieve any burning or pain from frequent diarrhea episodes.  Watch your condition for any changes.  Contact a health care provider if:  You have a fever.  Your diarrhea gets worse.  You have new symptoms.  You vomit every time you eat or drink.  You feel light-headed, dizzy, or have a headache.  You have muscle cramps.  You have signs of dehydration, such as:  Dark urine, very little urine, or no urine.  Cracked lips.  Dry mouth.  Sunken eyes.  Sleepiness.  Weakness.  You have bloody or black stools or stools that look like tar.  You have severe pain, cramping, or bloating in your abdomen.  Your skin feels cold and clammy.  You feel confused.  Get help right away if:  You have chest pain or your heart is beating very quickly.  You have trouble breathing or you are breathing very quickly.  You feel extremely weak or you faint.  These symptoms may be an emergency. Get help right away. Call 911.  Do not wait to see if the symptoms will go away.  Do not drive yourself to the hospital.  This information is not intended to replace advice given to you by your health care provider. Make sure you discuss any questions you have with your health care provider.

## 2024-06-05 NOTE — ED PROVIDER NOTE - PHYSICAL EXAMINATION
Gen:  Awake, alert, NAD, WDWN, NCAT, non-toxic appearing.   Eyes:  PERRL, EOMI, no icterus, normal lids/lashes, normal conjunctivae.  ENT:  External inspection normal, pink/dry membranes.   CV:  S1S2, regular rate and rhythm, no murmur/gallops/rubs, no JVD, 2+ pulses b/l, no edema/cords/homans, warm/well-perfused.  Resp:  Normal respiratory rate/effort, no respiratory distress, normal voice, speaking full sentences, lungs clear to auscultation b/l, no wheezing/rales/rhonchi, no retractions, no stridor.  Abd:  Soft abdomen, no tender/distended/guarding/rebound, no pulsatile mass, no CVA tender.   Rectal:  Normal rectal tone, no rectal mass, +external hemorrhoids, no fecal impaction, brown stool, chaperone: YOSVANY Parra  Musculoskeletal:  N/V intact, FROM all 4 extremities, normal motor tone, stable gait.   Neck:  FROM neck, supple, trachea midline, no meningismus.   Skin:  Color normal for race, warm and dry, no rash.  Neuro:  Oriented x3, CN 2-12 intact (grossly), normal motor (grossly), normal sensory (grossly), normal gait. GCS 15  Psych:  Attention normal. Affect normal. Behavior normal. Judgment normal.

## 2024-06-06 LAB
HCV AB S/CO SERPL IA: 0.1 S/CO — SIGNIFICANT CHANGE UP (ref 0–0.99)
HCV AB SERPL-IMP: SIGNIFICANT CHANGE UP

## 2024-06-10 ENCOUNTER — APPOINTMENT (OUTPATIENT)
Dept: CT IMAGING | Facility: CLINIC | Age: 79
End: 2024-06-10
Payer: MEDICARE

## 2024-06-10 PROCEDURE — 74176 CT ABD & PELVIS W/O CONTRAST: CPT

## 2024-06-13 RX ORDER — SIMVASTATIN 20 MG/1
1 TABLET, FILM COATED ORAL
Qty: 0 | Refills: 0 | DISCHARGE

## 2024-06-13 RX ORDER — LISINOPRIL 2.5 MG/1
1 TABLET ORAL
Qty: 0 | Refills: 0 | DISCHARGE

## 2024-06-13 RX ORDER — DAPAGLIFLOZIN AND METFORMIN HYDROCHLORIDE 10; 1000 MG/1; MG/1
0 TABLET, FILM COATED, EXTENDED RELEASE ORAL
Qty: 0 | Refills: 0 | DISCHARGE

## 2024-06-25 ENCOUNTER — EMERGENCY (EMERGENCY)
Facility: HOSPITAL | Age: 79
LOS: 1 days | Discharge: HOME | End: 2024-06-25
Attending: STUDENT IN AN ORGANIZED HEALTH CARE EDUCATION/TRAINING PROGRAM
Payer: MEDICARE

## 2024-06-25 VITALS
DIASTOLIC BLOOD PRESSURE: 67 MMHG | HEIGHT: 64 IN | WEIGHT: 126.99 LBS | SYSTOLIC BLOOD PRESSURE: 112 MMHG | OXYGEN SATURATION: 97 % | TEMPERATURE: 99 F | HEART RATE: 79 BPM | RESPIRATION RATE: 18 BRPM

## 2024-06-25 DIAGNOSIS — Z90.710 ACQUIRED ABSENCE OF BOTH CERVIX AND UTERUS: Chronic | ICD-10-CM

## 2024-06-25 PROBLEM — E78.00 PURE HYPERCHOLESTEROLEMIA, UNSPECIFIED: Chronic | Status: ACTIVE | Noted: 2018-03-27

## 2024-06-25 PROBLEM — E78.5 HYPERLIPIDEMIA, UNSPECIFIED: Chronic | Status: ACTIVE | Noted: 2024-06-05

## 2024-06-25 PROBLEM — E11.9 TYPE 2 DIABETES MELLITUS WITHOUT COMPLICATIONS: Chronic | Status: ACTIVE | Noted: 2018-03-27

## 2024-06-25 PROCEDURE — 90471 IMMUNIZATION ADMIN: CPT

## 2024-06-25 PROCEDURE — 90715 TDAP VACCINE 7 YRS/> IM: CPT

## 2024-06-25 PROCEDURE — 99283 EMERGENCY DEPT VISIT LOW MDM: CPT | Mod: 25

## 2024-06-25 PROCEDURE — 73630 X-RAY EXAM OF FOOT: CPT

## 2024-06-25 PROCEDURE — 73630 X-RAY EXAM OF FOOT: CPT | Mod: 26,LT

## 2024-06-25 PROCEDURE — 99284 EMERGENCY DEPT VISIT MOD MDM: CPT

## 2024-06-25 RX ORDER — TETANUS TOXOID, REDUCED DIPHTHERIA TOXOID AND ACELLULAR PERTUSSIS VACCINE, ADSORBED 5; 2.5; 8; 8; 2.5 [IU]/.5ML; [IU]/.5ML; UG/.5ML; UG/.5ML; UG/.5ML
0.5 SUSPENSION INTRAMUSCULAR ONCE
Refills: 0 | Status: COMPLETED | OUTPATIENT
Start: 2024-06-25 | End: 2024-06-25

## 2024-06-25 RX ORDER — ACETAMINOPHEN 500 MG
650 TABLET ORAL ONCE
Refills: 0 | Status: COMPLETED | OUTPATIENT
Start: 2024-06-25 | End: 2024-06-25

## 2024-06-25 RX ADMIN — Medication 650 MILLIGRAM(S): at 13:30

## 2024-06-25 RX ADMIN — TETANUS TOXOID, REDUCED DIPHTHERIA TOXOID AND ACELLULAR PERTUSSIS VACCINE, ADSORBED 0.5 MILLILITER(S): 5; 2.5; 8; 8; 2.5 SUSPENSION INTRAMUSCULAR at 13:00

## 2024-06-25 RX ADMIN — Medication 650 MILLIGRAM(S): at 13:00

## 2024-06-25 NOTE — ED ADULT NURSE NOTE - OBJECTIVE STATEMENT
Patient presented to the ED complaining of left 3rd toe pain after hitting foot on metal this morning.

## 2024-06-25 NOTE — ED PROVIDER NOTE - NSICDXPASTMEDICALHX_GEN_ALL_CORE_FT
PAST MEDICAL HISTORY:  Diabetes     DM (diabetes mellitus)     HLD (hyperlipidemia)     Hypercholesteremia

## 2024-06-25 NOTE — ED PROVIDER NOTE - NSFOLLOWUPCLINICS_GEN_ALL_ED_FT
[facility]  Podiatry/Wound Care  [address] [place] [address]  Phone: [phone]  Fax: [phone]

## 2024-06-25 NOTE — ED PROVIDER NOTE - CLINICAL SUMMARY MEDICAL DECISION MAKING FREE TEXT BOX
78-year-old female PMH HLD, DM2 presenting to emergency department for left foot pain s/p hitting foot against metal object this morning.   Endorsing pain to left third toe and bruising.  No pain meds prior to arrival.  Denies fever, numbness, other complaint.  Unsure of last Tdap.   PE as above, will obtain x-ray, carter tape, analgesia, reassess, dispo accordingly.

## 2024-06-25 NOTE — ED PROVIDER NOTE - OBJECTIVE STATEMENT
78-year-old female PMH HLD, DM2 presenting to emergency department for left foot pain s/p hitting foot against metal object this morning.   Endorsing pain to left third toe and bruising.  No pain meds prior to arrival.  Denies fever, numbness, other complaint.  Unsure of last Tdap.

## 2024-06-25 NOTE — ED PROVIDER NOTE - PATIENT PORTAL LINK FT
You can access the FollowMyHealth Patient Portal offered by [facility] by registering at the following website: [email or web address]. By joining [email or web address]’s FollowMyHealth portal, you will also be able to view your health information using other applications (apps) compatible with our system.

## 2024-06-25 NOTE — ED PROVIDER NOTE - ATTENDING APP SHARED VISIT CONTRIBUTION OF CARE
I, Sonia Marcelo DO reviewed the [facility] plan of care and discussed the case with the ACP.  I was available for any questions and concerns

## 2024-10-03 ENCOUNTER — APPOINTMENT (OUTPATIENT)
Dept: ENDOCRINOLOGY | Facility: CLINIC | Age: 79
End: 2024-10-03
Payer: MEDICARE

## 2024-10-03 VITALS
HEART RATE: 69 BPM | DIASTOLIC BLOOD PRESSURE: 75 MMHG | HEIGHT: 64 IN | SYSTOLIC BLOOD PRESSURE: 125 MMHG | WEIGHT: 121 LBS | RESPIRATION RATE: 16 BRPM | OXYGEN SATURATION: 98 % | BODY MASS INDEX: 20.66 KG/M2 | TEMPERATURE: 97.8 F

## 2024-10-03 DIAGNOSIS — F32.A DEPRESSION, UNSPECIFIED: ICD-10-CM

## 2024-10-03 DIAGNOSIS — Z78.9 OTHER SPECIFIED HEALTH STATUS: ICD-10-CM

## 2024-10-03 DIAGNOSIS — Z87.440 PERSONAL HISTORY OF URINARY (TRACT) INFECTIONS: ICD-10-CM

## 2024-10-03 DIAGNOSIS — E16.2 HYPOGLYCEMIA, UNSPECIFIED: ICD-10-CM

## 2024-10-03 DIAGNOSIS — E11.9 TYPE 2 DIABETES MELLITUS W/OUT COMPLICATIONS: ICD-10-CM

## 2024-10-03 DIAGNOSIS — Z83.3 FAMILY HISTORY OF DIABETES MELLITUS: ICD-10-CM

## 2024-10-03 LAB
GLUCOSE BLDC GLUCOMTR-MCNC: 149
HBA1C MFR BLD HPLC: 7.7

## 2024-10-03 PROCEDURE — 83036 HEMOGLOBIN GLYCOSYLATED A1C: CPT | Mod: QW

## 2024-10-03 PROCEDURE — 99204 OFFICE O/P NEW MOD 45 MIN: CPT

## 2024-10-03 PROCEDURE — 82962 GLUCOSE BLOOD TEST: CPT

## 2024-10-03 PROCEDURE — G0108 DIAB MANAGE TRN  PER INDIV: CPT

## 2024-10-03 RX ORDER — BLOOD SUGAR DIAGNOSTIC
STRIP MISCELLANEOUS
Qty: 1 | Refills: 0 | Status: ACTIVE | COMMUNITY
Start: 2024-10-03 | End: 1900-01-01

## 2024-10-03 RX ORDER — BLOOD-GLUCOSE METER
EACH MISCELLANEOUS
Qty: 1 | Refills: 0 | Status: ACTIVE | COMMUNITY
Start: 2024-10-03 | End: 1900-01-01

## 2024-10-03 RX ORDER — GLIPIZIDE 5 MG/1
5 TABLET ORAL
Qty: 180 | Refills: 1 | Status: ACTIVE | COMMUNITY
Start: 2024-10-03 | End: 1900-01-01

## 2024-10-03 NOTE — REVIEW OF SYSTEMS
[Fatigue] : fatigue [Pain/Numbness of Digits] : pain/numbness of digits [Decreased Appetite] : appetite not decreased [Recent Weight Gain (___ Lbs)] : no recent weight gain [Recent Weight Loss (___ Lbs)] : no recent weight loss [Blurred Vision] : no blurred vision [Dysphagia] : no dysphagia [Chest Pain] : no chest pain [Palpitations] : no palpitations [Lower Ext Edema] : no lower extremity edema [Shortness Of Breath] : no shortness of breath [SOB on Exertion] : no shortness of breath on exertion [Nausea] : no nausea [Constipation] : no constipation [Abdominal Pain] : no abdominal pain [Vomiting] : no vomiting [Diarrhea] : no diarrhea [Polyuria] : no polyuria [Joint Pain] : no joint pain [Muscle Weakness] : no muscle weakness [Headaches] : no headaches [Dizziness] : no dizziness [Tremors] : no tremors [Polydipsia] : no polydipsia [Cold Intolerance] : no cold intolerance

## 2024-10-03 NOTE — ASSESSMENT
[FreeTextEntry1] : 1) Type 2 Diabetes HBA1C is 7.7% appropriate for her age Patient is on glipizide and is likely having hypoglycemia when she has blurry vision and which is improved with oral juice intake  PLAN: = Continue Janumet 50/1000 mg BID, - Continue Farxiga 10 mg daily,  - Reduce Glipizide 5 mg daily. Discussed to check the BS at home, If the BS <70 discontinue Glipizide completely - Continue atorvastatin 20 mg for cardioprotection and hypercholersterolemia - Continue Lisinopril 2.5 mg daily  - Advised patient to measure sugars before meals and bedtime and have a log book of the numbers. Patient to bring log book prior to next Endocrine appointment -Ophthalmology referral for diabetic retinopathy surveillance - Medication compliance re-emphasized.   - DIABETES EDUCATION: Extensive education about diabetes, hyperglycemia, hypoglycemia, glucose target ranges, dietary adherence to ADA diet, avoiding rice and sweets, eating fruits and fresh vegetables highly recommended. Discussed the 6-inch plate method. Advised to avoid soda as it can contribute to weight gain Advise to increase the physical activity . Reviewed the importance of following up with outpatient endocrinology/ opthalmology and podiatry appointments discussed. Explained the definition of HBA1C and target range. Explained the complications of diabetes including stroke, renal failure and blindness Reviewed hypoglycemic sign/symptoms and necessary precautions. Advised to buy a glucose tab available OTC and check the Blood sugars when feeling lightheadedness, take half cup of juice or a glucose tab and recheck in 15 min to see if BS improves. Patient verbalized understanding and agrees with the plan

## 2024-10-03 NOTE — PHYSICAL EXAM
[Alert] : alert [No Acute Distress] : no acute distress [Normal Sclera/Conjunctiva] : normal sclera/conjunctiva [Normal Oropharynx] : the oropharynx was normal [No LAD] : no lymphadenopathy [Supple] : the neck was supple [Thyroid Not Enlarged] : the thyroid was not enlarged [No Thyroid Nodules] : no palpable thyroid nodules [No Edema] : no peripheral edema [Pedal Pulses Normal] : the pedal pulses are present [Normal Bowel Sounds] : normal bowel sounds [Not Tender] : non-tender [Not Distended] : not distended [Soft] : abdomen soft [Normal Anterior Cervical Nodes] : no anterior cervical lymphadenopathy [Normal Posterior Cervical Nodes] : no posterior cervical lymphadenopathy [No Spinal Tenderness] : no spinal tenderness [Spine Straight] : spine straight [No Stigmata of Cushings Syndrome] : no stigmata of Cushings Syndrome [Normal Gait] : normal gait [No Clubbing, Cyanosis] : no clubbing  or cyanosis of the fingernails [Normal Strength/Tone] : muscle strength and tone were normal [No Rash] : no rash [No Sensory Deficits] : the sensory exam was normal to light touch and pinprick [No Tremors] : no tremors [Oriented x3] : oriented to person, place, and time [Acanthosis Nigricans] : no acanthosis nigricans

## 2024-10-03 NOTE — HISTORY OF PRESENT ILLNESS
[FreeTextEntry1] :  79 year old F with PMH of type 2 diabetes, UTI, asymptomatic microscopic hematuria came for type 2 diabetes evaluation.   Diabetes diagnosed for 30 years   Medication regimen: Janumet 50/1000 mg BID, Farxiga 10 mg daily, Glipizide 10 mg once a day, atorvastatin 20 mg, lisinopril 2.5 mg  Recent fingersticks: Does not check the BS. Does not have glucometer Hypoglycemic events:  Feels blurry vision sometimes, reports it only improves when she drinks orange juice.  Diet:  Eat every 3 hours, adhere to low carb diet, usually Vegetables, brrocoli, carrots, chicken, rarely eat meat Opthalmology appointment: 1 year ago retinopathy Peripheral Neuropathy: denies

## 2025-01-02 ENCOUNTER — APPOINTMENT (OUTPATIENT)
Dept: ENDOCRINOLOGY | Facility: CLINIC | Age: 80
End: 2025-01-02

## 2025-02-28 ENCOUNTER — APPOINTMENT (OUTPATIENT)
Dept: CARDIOLOGY | Facility: CLINIC | Age: 80
End: 2025-02-28

## 2025-03-21 ENCOUNTER — NON-APPOINTMENT (OUTPATIENT)
Age: 80
End: 2025-03-21

## 2025-03-21 ENCOUNTER — APPOINTMENT (OUTPATIENT)
Dept: CARDIOLOGY | Facility: CLINIC | Age: 80
End: 2025-03-21
Payer: MEDICARE

## 2025-03-21 VITALS
BODY MASS INDEX: 20.77 KG/M2 | OXYGEN SATURATION: 97 % | TEMPERATURE: 98.3 F | HEART RATE: 73 BPM | SYSTOLIC BLOOD PRESSURE: 109 MMHG | WEIGHT: 121 LBS | DIASTOLIC BLOOD PRESSURE: 68 MMHG

## 2025-03-21 DIAGNOSIS — R94.31 ABNORMAL ELECTROCARDIOGRAM [ECG] [EKG]: ICD-10-CM

## 2025-03-21 DIAGNOSIS — E78.49 OTHER HYPERLIPIDEMIA: ICD-10-CM

## 2025-03-21 DIAGNOSIS — Z71.89 OTHER SPECIFIED COUNSELING: ICD-10-CM

## 2025-03-21 PROCEDURE — 99204 OFFICE O/P NEW MOD 45 MIN: CPT

## 2025-03-21 PROCEDURE — G2211 COMPLEX E/M VISIT ADD ON: CPT

## 2025-03-21 PROCEDURE — 93000 ELECTROCARDIOGRAM COMPLETE: CPT

## 2025-03-21 RX ORDER — ROSUVASTATIN CALCIUM 5 MG/1
5 TABLET, FILM COATED ORAL
Qty: 90 | Refills: 2 | Status: ACTIVE | COMMUNITY
Start: 2025-03-21 | End: 1900-01-01

## 2025-03-21 RX ORDER — LISINOPRIL 2.5 MG/1
2.5 TABLET ORAL
Refills: 0 | Status: ACTIVE | COMMUNITY

## 2025-03-21 RX ORDER — GLIPIZIDE 10 MG/1
10 TABLET ORAL
Refills: 0 | Status: ACTIVE | COMMUNITY

## 2025-03-21 RX ORDER — DAPAGLIFLOZIN 10 MG/1
10 TABLET, FILM COATED ORAL
Refills: 0 | Status: ACTIVE | COMMUNITY

## 2025-03-23 PROBLEM — Z71.89 CARDIAC RISK COUNSELING: Status: ACTIVE | Noted: 2025-03-23

## 2025-03-23 PROBLEM — R94.31 ABNORMAL ECG: Status: ACTIVE | Noted: 2025-03-23

## 2025-05-08 NOTE — ED PROVIDER NOTE - PHYSICAL EXAMINATION
Render In Strict Bullet Format?: No
Initiate Treatment: doxycycline hyclate 100 mg capsule Twice a day\\nQuantity: 28.0 Capsule  Days Supply: 14\\nSig: Take one capsule by mouth twice a day with food and water. Avoid diary 2 hours before/after. Do not lie down for 1 hr after.\\n\\nazelaic acid 15 % topical gel \\nQuantity: 50.0 g  Days Supply: 30\\nSig: Apply a thin layer to affected areas on face 2x a day for 4 weeks, repeat ONLY for flares
Detail Level: Zone
Gen: NAD, AOx3, able to make needs known, non-toxic  Head: NCAT  HEENT: EOMI, oral mucosa moist, normal conjunctiva  Lung: CTAB, no respiratory distress, no wheezes/rhonchi/rales B/L, speaking in full sentences  CV: RRR, no murmurs  Abd: non distended, soft, nontender, no guarding, no CVA tenderness  MSK: +left 3rd toe ecchymosis and ttp, +plantar/dorsiflexion, DPP intact  Neuro: Appears non focal  Skin: Warm, well perfused, no rash  Psych: normal affect
Initiate Treatment: doxycycline hyclate 100 mg capsule QD\\nQuantity: 14.0 Capsule  Days Supply: 14\\nSig: Take one capsule by mouth ONCE a day with food and water x 2 weeks. Avoid dairy 2 hrs before/after. Do not lie down for 1 hr after. Repeat ONLY as needed for flares\\n\\nerythromycin with ethanol 2 % topical gel \\nQuantity: 60.0 g  Days Supply: 30\\nSig: Apply a thin layer to affected areas on legs 2x a day for 2 weeks, repeat as needed for flares

## 2025-07-18 ENCOUNTER — APPOINTMENT (OUTPATIENT)
Dept: UROLOGY | Facility: CLINIC | Age: 80
End: 2025-07-18